# Patient Record
Sex: FEMALE | Race: WHITE | NOT HISPANIC OR LATINO | Employment: FULL TIME | URBAN - METROPOLITAN AREA
[De-identification: names, ages, dates, MRNs, and addresses within clinical notes are randomized per-mention and may not be internally consistent; named-entity substitution may affect disease eponyms.]

---

## 2017-01-14 ENCOUNTER — APPOINTMENT (OUTPATIENT)
Dept: LAB | Facility: HOSPITAL | Age: 47
End: 2017-01-14
Payer: COMMERCIAL

## 2017-01-14 ENCOUNTER — TRANSCRIBE ORDERS (OUTPATIENT)
Dept: LAB | Facility: HOSPITAL | Age: 47
End: 2017-01-14

## 2017-01-14 DIAGNOSIS — B18.2 CHRONIC VIRAL HEPATITIS C (HCC): ICD-10-CM

## 2017-01-14 PROCEDURE — 36415 COLL VENOUS BLD VENIPUNCTURE: CPT

## 2017-01-14 PROCEDURE — 87522 HEPATITIS C REVRS TRNSCRPJ: CPT

## 2017-01-17 LAB
HCV RNA SERPL NAA+PROBE-ACNC: NORMAL IU/ML
TEST INFORMATION: NORMAL

## 2017-01-20 ENCOUNTER — GENERIC CONVERSION - ENCOUNTER (OUTPATIENT)
Dept: OTHER | Facility: OTHER | Age: 47
End: 2017-01-20

## 2017-01-25 ENCOUNTER — GENERIC CONVERSION - ENCOUNTER (OUTPATIENT)
Dept: OTHER | Facility: OTHER | Age: 47
End: 2017-01-25

## 2017-04-18 ENCOUNTER — OFFICE VISIT (OUTPATIENT)
Dept: URGENT CARE | Age: 47
End: 2017-04-18
Payer: COMMERCIAL

## 2017-04-18 PROCEDURE — G0382 LEV 3 HOSP TYPE B ED VISIT: HCPCS | Performed by: FAMILY MEDICINE

## 2017-07-03 ENCOUNTER — ALLSCRIPTS OFFICE VISIT (OUTPATIENT)
Dept: OTHER | Facility: OTHER | Age: 47
End: 2017-07-03

## 2018-01-10 NOTE — RESULT NOTES
Verified Results  (1) CBC/PLT/DIFF 25Oct2016 06:35AM Steffi Montesw   TW Order Number: [id number]     Test Name Result Flag Reference   WBC COUNT 7 58 Thousand/uL  4 31-10 16   RBC COUNT 4 46 Million/uL  3 81-5 12   HEMOGLOBIN 14 0 g/dL  11 5-15 4   HEMATOCRIT 43 0 %  34 8-46  1   MCV 96 fL  82-98   MCH 31 4 pg  26 8-34 3   MCHC 32 6 g/dL  31 4-37 4   RDW 13 2 %  11 6-15 1   MPV 9 9 fL  8 9-12 7   PLATELET COUNT 858 Thousands/uL H 149-390   nRBC AUTOMATED 0 /100 WBCs     NEUTROPHILS RELATIVE PERCENT 53 %  43-75   LYMPHOCYTES RELATIVE PERCENT 36 %  14-44   MONOCYTES RELATIVE PERCENT 9 %  4-12   EOSINOPHILS RELATIVE PERCENT 2 %  0-6   BASOPHILS RELATIVE PERCENT 0 %  0-1   NEUTROPHILS ABSOLUTE COUNT 4 03 Thousands/?L  1 85-7 62   LYMPHOCYTES ABSOLUTE COUNT 2 70 Thousands/?L  0 60-4 47   MONOCYTES ABSOLUTE COUNT 0 65 Thousand/?L  0 17-1 22   EOSINOPHILS ABSOLUTE COUNT 0 17 Thousand/?L  0 00-0 61   BASOPHILS ABSOLUTE COUNT 0 02 Thousands/?L  0 00-0 10   - Patient Instructions: This bloodwork is non-fasting  Please drink two glasses of water morning of bloodwork  - Patient Instructions: This bloodwork is non-fasting  Please drink two glasses of water morning of bloodwork       (1) PT WITH INR 25Oct2016 06:35AM Steffi Montesw   TW Order Number: [id number]     Test Name Result Flag Reference   INR 1 05  0 86-1 16   PT 13 8 seconds  12 0-14 3

## 2018-01-11 NOTE — MISCELLANEOUS
Dear Sunita Sanches are some patient educational materials for [employer] and Ribavirin  As I discussed with you over the phone, you have been approved by your insurance  By this time, you should have already been  in touch with our [address] who will be filling your medication and shipping it to your home  It is very important that we keep in contact during this process, as your insurance is requiring re-authorization for refills, and we do not  want any gaps in your treatment  Please contact me when you have received your medication and are ready to start taking it       I can be reached at [phone] on Monday - Friday 8:00am - 4:30pm     Thank you,    Criselda Chairez RN      Electronically signed by:Marielena Marley   Jul 14 2016  4:08PM EST Author

## 2018-01-11 NOTE — RESULT NOTES
Message   All her labs are normal  Leidy Alonso will call her to work on prior autherization  Verified Results  US ABDOMEN LIMITED [id number] 07:39AM Vishal Abdelrahmans Order Number: [id number]     Test Name Result Flag Reference   US ABDOMEN LIMITED (Report)     RIGHT UPPER QUADRANT ULTRASOUND     INDICATION: History of hepatitis C  Status post cholecystectomy  COMPARISON: None  TECHNIQUE:  Real-time ultrasound of the right upper quadrant was performed with a curvilinear transducer with both volumetric sweeps and still imaging techniques  FINDINGS:     PANCREAS: Visualized portions of the pancreas are within normal limits  AORTA AND IVC: Visualized portions are normal for patient age  LIVER:   Size: Within normal range  The liver measures 14 7 cm in the midclavicular line  Contour: Surface contour is smooth  Parenchyma: Echogenicity and echotexture are within normal limits  No evidence of suspicious mass  The main portal vein is patent and hepatopetal       BILIARY:   Status post cholecystectomy  No intrahepatic biliary dilatation  CBD measures 4 mm  No choledocholithiasis  KIDNEY:    Right kidney measures 10 4 x 3 8 cm  Within normal limits  ASCITES:  None  IMPRESSION:     Unremarkable status post cholecystectomy  Workstation performed: [id number]     Signed by: Tommy Patel MD   5/23/16     (1) DRUG ABUSE SCREEN, URINE ROUTINE [id number] 07:07AM Vishal Field Order Number: [id number]     Test Name Result Flag Reference   AMPHETAMINE SCREEN URINE Negative ng/mL  Mfouba=7113   Amphetamine test includes Amphetamine and Methamphetamine  BARBITURATE SCREEN URINE Negative ng/mL  Hzigse=672   BENZODIAZEPINE SCREEN, URINE Negative ng/mL  Aiwjxr=530   CANNABINOID SCREEN URINE Negative ng/mL  Cutoff=50   COCAINE(METAB  )SCREEN, URINE Negative ng/mL  Kcfpkl=735   METHADONE SCREEN, URINE Negative ng/mL  Tnabnc=763   OPIATE SCREEN URINE Negative ng/mL  Embwgx=523   Opiate test includes Codeine and Morphine only     PHENCYCLIDINE (PCP), QUAL, UR Negative ng/mL  Cutoff=25   PROPOXYPHENE, SCREEN Negative ng/mL  Ofatlz=325   Performed at:  [address]  [id number]  : Ceci Michelle MD, Phone:  [phone]     (1) ALCOHOL,MEDICAL (SERUM) [id number] 07:07AM Emani Ag    Order Number: [id number]   Order Number: [id number]     Test Name Result Flag Reference   ALCOHOL,MEDICAL (SERUM) <3 mg/dL  0-3     (1) HEP B SURFACE ANTIGEN [id number] 07:07AM Jestine Balls Order Number: [id number]   Order Number: [id number]     Test Name Result Flag Reference   HEPATITIS B SURFACE ANTIGEN Non-reactive  Non-reactive, NonReactive - Confirmed     (1) HEP B SURFACE ANTIBODY [id number] 07:07AM Jestine Balls Order Number: [id number]   Order Number: [id number]     Test Name Result Flag Reference   HEPATITIS B SURFACE ANTIBODY >1000 00 mIU/mL     Protective Immunity: Hep B Surface Antibody >= 10 mIu/ml (Traceable to [facility] Reference Preparation)

## 2018-01-11 NOTE — RESULT NOTES
Verified Results  (1) CBC/ PLT (NO DIFF) [id number] 01:50PM Kathleen Chin Order Number: [id number]  TW Order Number: [id number]     Test Name Result Flag Reference   HEMATOCRIT 35 1 %  34 8-46 1   HEMOGLOBIN 11 4 g/dL L 11 5-15 4   MCHC 32 5 g/dL  31 4-37 4   MCH 30 4 pg  26 8-34 3   MCV 94 fL  82-98   PLATELET COUNT 054 Thousands/uL  149-390   RBC COUNT 3 75 Million/uL L 3 81-5 12   RDW 14 2 %  11 6-15 1   WBC COUNT 7 39 Thousand/uL  4 31-10 16   MPV 9 9 fL  8 9-12 7     (1) COMPREHENSIVE METABOLIC PANEL [id number] 15:32XT Kathleen Chin Order Number: [id number]   Order Number: [id number]     Test Name Result Flag Reference   GLUCOSE,RANDM 91 mg/dL     If the patient is fasting, the ADA then defines impaired fasting glucose as > 100 mg/dL and diabetes as > or equal to 123 mg/dL  SODIUM 138 mmol/L  136-145   POTASSIUM 4 4 mmol/L  3 5-5 3   CHLORIDE 106 mmol/L  100-108   CARBON DIOXIDE 25 mmol/L  21-32   ANION GAP (CALC) 7 mmol/L  4-13   BLOOD UREA NITROGEN 9 mg/dL  5-25   CREATININE 0 75 mg/dL  0 60-1 30   Standardized to IDMS reference method   CALCIUM 9 2 mg/dL  8 3-10 1   BILI, TOTAL 1 33 mg/dL H 0 20-1 00   ALK PHOSPHATAS 94 U/L     ALT (SGPT) 15 U/L  12-78   AST(SGOT) 9 U/L  5-45   ALBUMIN 4 1 g/dL  3 5-5 0   TOTAL PROTEIN 7 4 g/dL  6 4-8 2   eGFR Non-African American      >60 0 ml/min/1 73sq m   [facility] Disease Education Program recommendations are as follows:  GFR calculation is accurate only with a steady state creatinine  Chronic Kidney disease less than 60 ml/min/1 73 sq  meters  Kidney failure less than 15 ml/min/1 73 sq  meters  Plan  Chronic hepatitis C without hepatic coma    · (1) CBC/PLT/DIFF; Status:Active;  Requested for:01Oct2016;

## 2018-01-11 NOTE — RESULT NOTES
Verified Results  (1) CBC/PLT/DIFF 01Oct2016 09:36AM Mariama Moya   TW Order Number: [id number]     Test Name Result Flag Reference   WBC COUNT 8 12 Thousand/uL  4 31-10 16   RBC COUNT 4 01 Million/uL  3 81-5 12   HEMOGLOBIN 12 4 g/dL  11 5-15 4   HEMATOCRIT 38 1 %  34 8-46  1   MCV 95 fL  82-98   MCH 30 9 pg  26 8-34 3   MCHC 32 5 g/dL  31 4-37 4   RDW 14 0 %  11 6-15 1   MPV 9 2 fL  8 9-12 7   PLATELET COUNT 196 Thousands/uL  149-390   nRBC AUTOMATED 0 /100 WBCs     NEUTROPHILS RELATIVE PERCENT 60 %  43-75   LYMPHOCYTES RELATIVE PERCENT 28 %  14-44   MONOCYTES RELATIVE PERCENT 9 %  4-12   EOSINOPHILS RELATIVE PERCENT 3 %  0-6   BASOPHILS RELATIVE PERCENT 0 %  0-1   NEUTROPHILS ABSOLUTE COUNT 4 83 Thousands/?L  1 85-7 62   LYMPHOCYTES ABSOLUTE COUNT 2 29 Thousands/?L  0 60-4 47   MONOCYTES ABSOLUTE COUNT 0 70 Thousand/?L  0 17-1 22   EOSINOPHILS ABSOLUTE COUNT 0 27 Thousand/?L  0 00-0 61   BASOPHILS ABSOLUTE COUNT 0 02 Thousands/?L  0 00-0 10   - Patient Instructions: This bloodwork is non-fasting  Please drink two glasses of water morning of bloodwork  - Patient Instructions: This bloodwork is non-fasting  Please drink two glasses of water morning of bloodwork

## 2018-01-12 NOTE — MISCELLANEOUS
Provider Comments  Provider Comments:   pt no show for her annual      Signatures   Electronically signed by : Rc Rodriguez, ; Jul  3 2017 11:27AM EST                       (Author)

## 2018-01-13 NOTE — MISCELLANEOUS
Message  78-year-old female with chronic hepatitis C, GT1a, completed 12 weeks of Viekira and Ribavirin on October 12, 2016  She went for her 3 month SVR test which showed her viral load as undetectable  This is considered a cure  She has been informed of this and reports doing well  All questions were answered  Pt was advised that they are not immune to acquiring a new case of Hep C, so they should avoid high risk behaviors including sharing razor blades and toothbrushes, etc  Repeat SVR testing can be considered in one year  Follow-up as needed  Active Problems    1  Abdominal pain, epigastric (789 06) (R10 13)   2  Acute bronchitis (466 0) (J20 9)   3  Chronic fatigue (780 79) (R53 82)   4  Chronic hepatitis C without hepatic coma (070 54) (B18 2)   5  Constipation (564 00) (K59 00)   6  Dry cough (786 2) (R05)   7  Encounter for routine gynecological examination (V72 31) (Z01 419)   8  Encounter for routine gynecological examination with Papanicolaou smear of cervix   (V72 31,V76 2) (Z01 419)   9  Encounter for screening mammogram for malignant neoplasm of breast (V76 12)   (Z12 31)   10  Facial skin lesion (709 9) (L98 9)   11  Need for influenza vaccination (V04 81) (Z23)   12  Need for Tdap vaccination (V06 1) (Z23)   13  Screening for hyperlipidemia (V77 91) (Z13 220)   14  Viral hepatitis C (070 70) (B19 20)    Current Meds   1  Fiber (Guar Gum) CHEW; TAKE AS DIRECTED; Therapy: 83Sco6932 to Recorded   2  Multi Adult Gummies Oral Tablet Chewable; CHEW AND SWALLOW 1 TABLET DAILY; Therapy: 13Yfa3999 to Recorded   3  Nicotine 14 MG/24HR Transdermal Patch 24 Hour; APPLY 1 PATCH DAILY AS   DIRECTED; Therapy: 04PFK1819 to (Evaluate:60Mtq8262)  Requested for: 57FWE9249; Last   Rx:21Ebr8175 Ordered    Allergies    1  Penicillins    2  No Known Environmental Allergies   3   No Known Food Allergies    Signatures   Electronically signed by : Argenis Little, ; Jan 25 2017 12:25PM EST (Author)

## 2018-01-15 NOTE — RESULT NOTES
Verified Results  (1) HEP C RNA PCR, QUANTITATIVE 25Oct2016 06:35AM Julianne Garcia Order Number: [id number]     Test Name Result Flag Reference   HCV PCR QUANTITATIVE      HCV Not Detected IU/mL   TEST INFORMATION Comment     The quantitative range of this assay is 15 IU/mL to 100 million IU/mL      Performed at:  [address] [place] [address]  [id number]  : Jonelle Johnson MD, Phone:  [phone]

## 2018-01-15 NOTE — RESULT NOTES
Verified Results  (1) HEP C RNA PCR, QUANTITATIVE [id number] 07:28AM Kathleen [facility] Order Number: [id number]     Test Name Result Flag Reference   HCV PCR QUANTITATIVE      HCV Not Detected IU/mL   TEST INFORMATION Comment     The quantitative range of this assay is 15 IU/mL to 100 million IU/mL      Performed at:  [facility] [place] [address] [facility] [address]  [id number]  : Mainor Ramey MD, Phone:  [phone]

## 2018-01-16 NOTE — RESULT NOTES
Message  Patient's insurance denied [facility]  Sarah Mcclure is on their formulary  Plan - Viekira wilmer and riba for 12 weeks  Plan  Chronic hepatitis C without hepatic coma    · Harvoni  MG Oral Tablet   · Ribavirin 200 MG Oral Tablet; 200 mg tablets    Take 3 tablets in the morning and  Tke 2 tablets in the evening  daily x 28 days   · Viekira Wilmer 12 5-75-50 &250 MG Oral Tablet Therapy Pack; 1 tablet thepay pack  daily

## 2018-01-17 NOTE — RESULT NOTES
Verified Results  (1) HEP C RNA PCR, QUANTITATIVE [id number] 07:32AM Elizabeth Justice Order Number: [id number]     Test Name Result Flag Reference   HCV PCR QUANTITATIVE      HCV Not Detected IU/mL   TEST INFORMATION Comment     The quantitative range of this assay is 15 IU/mL to 100 million IU/mL      Performed at:  [facility] [place] [address] [facility] [address]  [id number]  : Judy Hill MD, Phone:  [phone]

## 2018-01-18 NOTE — MISCELLANEOUS
Message  Return to work or school:   Ena Ashley is under my professional care   She was seen in my office on 10/19/16             Future Appointments    Signatures   Electronically signed by : Amish Covarrubias MD; Oct 19 2016 11:30AM EST                       (Author)
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
## 2018-08-21 ENCOUNTER — APPOINTMENT (EMERGENCY)
Dept: RADIOLOGY | Facility: HOSPITAL | Age: 48
End: 2018-08-21
Payer: COMMERCIAL

## 2018-08-21 ENCOUNTER — HOSPITAL ENCOUNTER (EMERGENCY)
Facility: HOSPITAL | Age: 48
Discharge: HOME | End: 2018-08-21
Attending: EMERGENCY MEDICINE
Payer: COMMERCIAL

## 2018-08-21 VITALS
BODY MASS INDEX: 22.5 KG/M2 | DIASTOLIC BLOOD PRESSURE: 72 MMHG | OXYGEN SATURATION: 99 % | WEIGHT: 140 LBS | HEART RATE: 68 BPM | SYSTOLIC BLOOD PRESSURE: 108 MMHG | HEIGHT: 66 IN | RESPIRATION RATE: 16 BRPM

## 2018-08-21 DIAGNOSIS — S16.1XXA CERVICAL STRAIN: ICD-10-CM
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
## 2018-08-21 PROCEDURE — 99284 EMERGENCY DEPT VISIT MOD MDM: CPT

## 2018-08-21 PROCEDURE — 96372 THER/PROPH/DIAG INJ SC/IM: CPT

## 2018-08-21 PROCEDURE — 70450 CT HEAD/BRAIN W/O DYE: CPT

## 2018-08-21 PROCEDURE — 72125 CT NECK SPINE W/O DYE: CPT

## 2018-08-21 RX ORDER — ACETAMINOPHEN 325 MG/1
975 TABLET ORAL ONCE
Status: COMPLETED | OUTPATIENT
Start: 2018-08-21 | End: 2018-08-21

## 2018-08-21 RX ORDER — KETOROLAC TROMETHAMINE 30 MG/ML
15 INJECTION, SOLUTION INTRAMUSCULAR; INTRAVENOUS ONCE
Status: COMPLETED | OUTPATIENT
Start: 2018-08-21 | End: 2018-08-21

## 2018-08-21 RX ADMIN — KETOROLAC TROMETHAMINE 15 MG: 30 INJECTION, SOLUTION INTRAMUSCULAR at 08:19

## 2018-08-21 RX ADMIN — ACETAMINOPHEN 975 MG: 325 TABLET, FILM COATED ORAL at 08:20

## 2018-08-21 NOTE — DISCHARGE INSTRUCTIONS
Cervical Strain   WHAT YOU NEED TO KNOW:   A cervical strain is a stretched or torn muscle or tendon in your neck  Tendons are strong tissues that connect muscles to bones  Common causes of cervical strains include a car accident, a fall, or a sports injury  DISCHARGE INSTRUCTIONS:   Return to the emergency department if:   · You have pain or numbness from your shoulder down to your hand  · You have problems with your vision, hearing, or balance  · You feel confused or cannot concentrate  · You have problems with movement and strength  Contact your healthcare provider if:   · You have increased swelling or pain in your neck  · You have questions or concerns about your condition or care  Medicines: You may need any of the following:  · Acetaminophen  decreases pain and fever  It is available without a doctor's order  Ask how much to take and how often to take it  Follow directions  Read the labels of all other medicines you are using to see if they also contain acetaminophen, or ask your doctor or pharmacist  Acetaminophen can cause liver damage if not taken correctly  Do not use more than 4 grams (4,000 milligrams) total of acetaminophen in one day  · NSAIDs , such as ibuprofen, help decrease swelling, pain, and fever  This medicine is available with or without a doctor's order  NSAIDs can cause stomach bleeding or kidney problems in certain people  If you take blood thinner medicine, always ask your healthcare provider if NSAIDs are safe for you  Always read the medicine label and follow directions  · Muscle relaxers  help decrease pain and muscle spasms  · Prescription pain medicine  may be given  Ask your healthcare provider how to take this medicine safely  Some prescription pain medicines contain acetaminophen  Do not take other medicines that contain acetaminophen without talking to your healthcare provider  Too much acetaminophen may cause liver damage   Prescription pain medicine may cause constipation  Ask your healthcare provider how to prevent or treat constipation  · Take your medicine as directed  Contact your healthcare provider if you think your medicine is not helping or if you have side effects  Tell him or her if you are allergic to any medicine  Keep a list of the medicines, vitamins, and herbs you take  Include the amounts, and when and why you take them  Bring the list or the pill bottles to follow-up visits  Carry your medicine list with you in case of an emergency  Manage your symptoms:   · Apply heat  on your neck for 15 to 20 minutes, 4 to 6 times a day or as directed  Heat helps decrease pain, stiffness, and muscle spasms  · Begin gentle neck exercises  as soon as you can move your neck without pain  Exercises will help decrease stiffness and improve the strength and movement of your neck  Ask your healthcare provider what kind of exercises you should do  · Gradually return to your usual activities as directed  Stop if you have pain  Avoid activities that can cause more damage to your neck, such as heavy lifting or strenuous exercise  · Sleep without a pillow  to help decrease pain  Instead, roll a small towel tightly and place it under your neck  · Go to physical therapy as directed  A physical therapist teaches you exercises to help improve movement and strength, and to decrease pain  Prevent neck injury:   · Drive safely  Make sure everyone in your car wears a seatbelt  A seatbelt can save your life if you are in an accident  Do not use your cell phone when you are driving  This could distract you and cause an accident  Pull over if you need to make a call or send a text message  · Wear helmets, lifejackets, and protective gear  Always wear a helmet when you ride a bike or motorcycle, go skiing, or play sports that could cause a head injury  Wear protective equipment when you play sports   Wear a lifejacket when you are on a boat or doing water sports  Follow up with your healthcare provider as directed: You may be referred to an orthopedist or physical therapies  Write down your questions so you remember to ask them during your visits  © 2017 [employer] Information is for End User's use only and may not be sold, redistributed or otherwise used for commercial purposes  All illustrations and images included in CareNotes® are the copyrighted property of A D A M , Inc  or [employer]  The above information is an  only  It is not intended as medical advice for individual conditions or treatments  Talk to your doctor, nurse or pharmacist before following any medical regimen to see if it is safe and effective for you
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
## 2018-08-28 RX ORDER — PREDNISONE 50 MG/1
1 TABLET ORAL DAILY
COMMUNITY
Start: 2017-04-18 | End: 2018-08-29

## 2018-08-29 ENCOUNTER — OFFICE VISIT (OUTPATIENT)
Dept: INTERNAL MEDICINE CLINIC | Facility: CLINIC | Age: 48
End: 2018-08-29
Payer: COMMERCIAL

## 2018-08-29 VITALS
HEART RATE: 80 BPM | BODY MASS INDEX: 22.39 KG/M2 | WEIGHT: 139.33 LBS | TEMPERATURE: 98.1 F | HEIGHT: 66 IN | SYSTOLIC BLOOD PRESSURE: 108 MMHG | DIASTOLIC BLOOD PRESSURE: 80 MMHG
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
## 2018-08-29 DIAGNOSIS — M54.2 NECK PAIN: Primary | ICD-10-CM

## 2018-08-29 PROCEDURE — 99204 OFFICE O/P NEW MOD 45 MIN: CPT | Performed by: NURSE PRACTITIONER

## 2018-08-29 RX ORDER — CYCLOBENZAPRINE HCL 5 MG
5 TABLET ORAL 3 TIMES DAILY PRN
Qty: 60 TABLET | Refills: 0 | Status: SHIPPED | OUTPATIENT
Start: 2018-08-29 | End: 2021-06-03
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
## 2018-08-29 NOTE — PATIENT INSTRUCTIONS
Acute Neck Pain   WHAT YOU NEED TO KNOW:   Acute neck pain starts suddenly, increases quickly, and goes away in a few days  The pain may come and go, or be worse with certain movements  The pain may be only in your neck, or it may move to your arms, back, or shoulders  You may also have pain that starts in another body area and moves to your neck  DISCHARGE INSTRUCTIONS:   Return to the emergency department if:   · You have an injury that causes neck pain and shooting pain down your arms or legs  · Your neck pain suddenly becomes severe  · You have neck pain along with numbness, tingling, or weakness in your arms or legs  · You have a stiff neck, a headache, and a fever  Contact your healthcare provider if:   · You have new or worsening symptoms  · Your symptoms continue even after treatment  · You have questions or concerns about your condition or care  Medicines:   · NSAIDs , such as ibuprofen, help decrease swelling, pain, and fever  This medicine is available without a doctor's order  Ask your healthcare provider which medicine to take and how often to take it  Follow directions  NSAIDs can cause stomach bleeding or kidney problems if not taken correctly  If you take blood thinner medicine, always ask if NSAIDs are safe for you  · Acetaminophen  helps decrease pain and fever  Ask your healthcare provider how much to take and how often to take it  Follow directions  Acetaminophen can cause liver damage if not taken correctly  · Steroid medicine  may be used to reduce inflammation  This can help relieve pain caused by swelling  · Take your medicine as directed  Contact your healthcare provider if you think your medicine is not helping or if you have side effects  Tell him or her if you are allergic to any medicine  Keep a list of the medicines, vitamins, and herbs you take  Include the amounts, and when and why you take them  Bring the list or the pill bottles to follow-up visits  Carry your medicine list with you in case of an emergency  Manage or prevent acute neck pain:   · Rest your neck as directed  Do not make sudden movements, such as turning your head quickly  Your healthcare provider may recommend you wear a cervical collar for a short time  The collar will prevent you from moving your head  This will give your neck time to heal if an injury is causing your neck pain  Ask your healthcare provider when you can return to sports or other normal daily activities  · Apply heat as directed  Heat helps relieve pain and swelling  Use a heat wrap, or soak a small towel in warm water  Wring out the extra water  Apply the heat wrap or towel for 20 minutes every hour, or as directed  · Apply ice as directed  Ice helps relieve pain and swelling, and can help prevent tissue damage  Use an ice pack, or put ice in a bag  Cover the ice pack or back with a towel before you apply it to your neck  Apply the ice pack or ice for 15 minutes every hour, or as directed  Your healthcare provider can tell you how often to apply ice  · Do neck exercises as directed  Neck exercises help strengthen the muscles and increase range of motion  Your healthcare provider will tell you which exercises are right for you  He may give you instructions, or he may recommend that you work with a physical therapist  Your healthcare provider or therapist can make sure you are doing the exercises correctly  · Maintain good posture  Try to keep your head and shoulders lifted when you sit  If you work in front of a computer, make sure the monitor is at the right level  You should not need to look up down to see the screen  You should also not have to lean forward to be able to read what is on the screen  Make sure your keyboard, mouse, and other computer items are placed where you do not have to extend your shoulder to reach them  Get up often if you work in front of a computer or sit for long periods of time  Stretch or walk around to keep your neck muscles loose  Follow up with your healthcare provider as directed: Your healthcare provider may refer you to a specialist if your pain does not get better with treatment  Write down your questions so you remember to ask them during your visits  © 2017 [employer] Information is for End User's use only and may not be sold, redistributed or otherwise used for commercial purposes  All illustrations and images included in CareNotes® are the copyrighted property of [employer]  or [employer]  The above information is an  only  It is not intended as medical advice for individual conditions or treatments  Talk to your doctor, nurse or pharmacist before following any medical regimen to see if it is safe and effective for you

## 2019-02-07 ENCOUNTER — OFFICE VISIT (OUTPATIENT)
Dept: OBGYN CLINIC | Facility: CLINIC | Age: 49
End: 2019-02-07

## 2019-02-07 VITALS
HEART RATE: 84 BPM | SYSTOLIC BLOOD PRESSURE: 122 MMHG | HEIGHT: 66 IN | BODY MASS INDEX: 21.86 KG/M2 | DIASTOLIC BLOOD PRESSURE: 74 MMHG | WEIGHT: 136 LBS

## 2019-02-07 DIAGNOSIS — Z01.419 WOMEN'S ANNUAL ROUTINE GYNECOLOGICAL EXAMINATION: Primary | ICD-10-CM

## 2019-02-07 DIAGNOSIS — Z12.39 BREAST CANCER SCREENING: ICD-10-CM

## 2019-02-07 PROCEDURE — 99396 PREV VISIT EST AGE 40-64: CPT | Performed by: NURSE PRACTITIONER

## 2019-02-07 PROCEDURE — 3725F SCREEN DEPRESSION PERFORMED: CPT | Performed by: NURSE PRACTITIONER

## 2019-02-07 NOTE — PATIENT INSTRUCTIONS
Mammogram   WHAT YOU NEED TO KNOW:   What do I need to know about a mammogram?  A mammogram is an x-ray of your breasts to screen for breast cancer  Experts recommend mammograms every 2 years starting at age 48 years  You may need a mammogram at age 52 years or younger if you have an increased risk for breast cancer  Talk to your healthcare provider about when you should start having mammograms and how often you need them  How do I prepare for a mammogram?   · Do not use deodorant, powder, lotion, or perfume  These products may cause particles to appear on your mammogram      · Wear a 2-piece outfit  · If your breasts are tender before your monthly period, do not have a mammogram during this time  Schedule your mammogram to be done 1 week after your period ends  · If you are breastfeeding, express as much milk as possible before the mammogram     · Bring a list of the dates and places of your past mammograms and other breast tests or treatments  What will happen during a mammogram?  A top view and a side view x-ray are usually done for each breast  Tell healthcare providers if you have breast implants or breast problems before you have your mammogram  You may need extra x-rays of each breast   · You will be given a hospital gown  Take off your clothes from the waist up  Wear the hospital gown so that it opens in the front  · You will sit or stand next to a small x-ray machine  The healthcare provider will help you place one of your breasts on the x-ray plate  Your arm and breast will be moved until your breast is in the correct position  · Your breast will be gently pressed between 2 plastic plates for a few seconds while the x-ray is taken  This may be uncomfortable  · You will be asked to hold your breath while the x-ray is taken  Another x-ray will be taken of the same breast after the position of the x-ray machine has been changed  · Your other breast will be x-rayed the same way    What will happen after my mammogram?  Your breasts may feel tender for a short while after the mammogram  You may resume your regular activities  Ask your healthcare provider when you should receive the results of your mammogram   What are the risks of a mammogram?  You will be exposed to a small amount of radiation  Some breast cancers may not show up on mammograms  When should I contact my healthcare provider? · You cannot make your appointment on time  · You do not receive your results when expected  · You have questions or concerns about the mammogram   CARE AGREEMENT:   You have the right to help plan your care  Learn about your health condition and how it may be treated  Discuss treatment options with your caregivers to decide what care you want to receive  You always have the right to refuse treatment  The above information is an  only  It is not intended as medical advice for individual conditions or treatments  Talk to your doctor, nurse or pharmacist before following any medical regimen to see if it is safe and effective for you  © 2017 [employer]  Information is for End User's use only and may not be sold, redistributed or otherwise used for commercial purposes  All illustrations and images included in CareNotes® are the copyrighted property of [employer]  or [employer]  Breast Self Exam for Women   WHAT YOU NEED TO KNOW:   What is a breast self-exam (BSE)? A BSE is a way to check your breasts for lumps and other changes  Regular BSEs can help you know how your breasts normally look and feel  Most breast lumps or changes are not cancer, but you should always have them checked by a healthcare provider  Your healthcare provider can also watch you do a BSE and can tell you if you are doing your BSE correctly  Why should I do a BSE? Breast cancer is the most common type of cancer in women   Even if you have mammograms, you may still want to do a BSE regularly  If you know how your breasts normally feel and look, it may help you know when to contact your healthcare provider  Mammograms can miss some cancers  You may find a lump during a BSE that did not show up on your mammogram   When should I do a BSE? Anshul your calendar to help you remember to do BSE on a regular schedule  One easy way to remember to do a BSE is to do the exam on the same day of each month  If you have periods, you may want to do your BSE 1 week after your period ends  This is the time when your breasts may be the least swollen, lumpy, or tender  You can do regular BSEs even if you are breastfeeding or have breast implants  How should I do a BSE? · Look at your breasts in a mirror  Look at the size and shape of each breast and nipple  Check for swelling, lumps, dimpling, scaly skin, or other skin changes  Look for nipple changes, such as a nipple that is painful or beginning to pull inward  Gently squeeze both nipples and check to see if fluid (that is not breast milk) comes out of them  If you find any of these or other breast changes, contact your healthcare provider  Check your breasts while you sit or  the following 3 positions:    [facility] your arms down at your sides  ¨ Raise your hands and join them behind your head  ¨ Put firm pressure with your hands on your hips  Bend slightly forward while you look at your breasts in the mirror  · Lie down and feel your breasts  When you lie down, your breast tissue spreads out evenly over your chest  This makes it easier for you to feel for lumps and anything that may not be normal for your breasts  Do a BSE on one breast at a time  ¨ Place a small pillow or towel under your left shoulder  Put your left arm behind your head  ¨ Use the 3 middle fingers of your right hand  Use your fingertip pads, on the top of your fingers  Your fingertip pad is the most sensitive part of your finger      ¨ Use small circles to feel your breast tissue  Use your fingertip pads to make dime-sized, overlapping circles on your breast and armpits  Use light, medium, and firm pressure  First, press lightly  Second, press with medium pressure to feel a little deeper into the breast  Last, use firm pressure to feel deep within your breast     ¨ Examine your entire breast area  Examine the breast area from above the breast to below the breast where you feel only ribs  Make small circles with your fingertips, starting in the middle of your armpit  Make circles going up and down the breast area  Continue toward your breast and all the way across it  Examine the area from your armpit all the way over to the middle of your chest (breastbone)  Stop at the middle of your chest     ¨ Move the pillow or towel to your right shoulder, and put your right arm behind your head  Use the 3 fingertip pads of your left hand, and repeat the above steps to do a BSE on your right breast        What else can I do to check for breast problems or cancer? Some experts suggest that women 36years of age or older should have a mammogram every year  Other experts suggest that women between the ages of 48and 76years old should have a mammogram every 2 years  Talk to your healthcare provider about when you should have a mammogram   When should I contact my healthcare provider? · You find any lumps or changes in your breasts  · You have breast pain or fluid coming from your nipples  · You have questions or concerns or concerns about your condition or care  CARE AGREEMENT:   You have the right to help plan your care  Learn about your health condition and how it may be treated  Discuss treatment options with your caregivers to decide what care you want to receive  You always have the right to refuse treatment  The above information is an  only  It is not intended as medical advice for individual conditions or treatments   Talk to your doctor, nurse or pharmacist before following any medical regimen to see if it is safe and effective for you  © 2017 [address] [employer] Information is for End User's use only and may not be sold, redistributed or otherwise used for commercial purposes  All illustrations and images included in CareNotes® are the copyrighted property of [employer] Inc  or Zay [employer]  Cigarette Smoking and Your Health   AMBULATORY CARE:   Risks to your health if you smoke:  Nicotine and other chemicals found in tobacco damage every cell in your body  Even if you are a light smoker, you have an increased risk for cancer, heart disease, and lung disease  If you are pregnant or have diabetes, smoking increases your risk for complications  Benefits to your health if you stop smoking:   · You decrease respiratory symptoms such as coughing, wheezing, and shortness of breath  · You reduce your risk for cancers of the lung, mouth, throat, kidney, bladder, pancreas, stomach, and cervix  If you already have cancer, you increase the benefits of chemotherapy  You also reduce your risk for cancer returning or a second cancer from developing  · You reduce your risk for heart disease, blood clots, heart attack, and stroke  · You reduce your risk for lung infections, and diseases such as pneumonia, asthma, chronic bronchitis, and emphysema  · Your circulation improves  More oxygen can be delivered to your body  If you have diabetes, you lower your risk for complications, such as kidney, artery, and eye diseases  You also lower your risk for nerve damage  Nerve damage can lead to amputations, poor vision, and blindness  · You improve your body's ability to heal and to fight infections  Benefits to the health of others if you stop smoking:  Tobacco is harmful to nonsmokers who breathe in your secondhand smoke   The following are ways the health of others around you may improve when you stop smoking:  · You lower the risks for lung cancer and heart disease in nonsmoking adults  · If you are pregnant, you lower the risk for miscarriage, early delivery, low birth weight, and stillbirth  You also lower your baby's risk for SIDS, obesity, developmental delay, and neurobehavioral problems, such as ADHD  · If you have children, you lower their risk for ear infections, colds, pneumonia, bronchitis, and asthma  For more information and support to stop smoking:   · [facility]  Phone: 0- 578 - [phone]  Web Address: www [employer]  Follow up with your healthcare provider as directed:  Write down your questions so you remember to ask them during your visits  © 2017 [facility] [facility] Information is for End User's use only and may not be sold, redistributed or otherwise used for commercial purposes  All illustrations and images included in CareNotes® are the copyrighted property of [employer]  or Zay Roth  The above information is an  only  It is not intended as medical advice for individual conditions or treatments  Talk to your doctor, nurse or pharmacist before following any medical regimen to see if it is safe and effective for you

## 2019-02-07 NOTE — PROGRESS NOTES
Dallas Mccabe is a 51 y o  female who presents for annual well woman exam   Last Pap smear 2/10/16- NILM/ HR HPV negative  Next Pap smear due 2021  Last mammogram 3/2016-with recommendations to follow up 1 year  Mammogram ordered today  Has not had colonoscopy  Periods are regular every 28-30 days, lasting 3-4 days  No intermenstrual bleeding, spotting, or discharge  Current contraception: abstinence  History of abnormal Pap smear: yes - many years ago  Family history of uterine or ovarian cancer: no  Regular self breast exam: no  History of abnormal mammogram: yes - breast cyst found in   Family history of breast cancer: no  History of abnormal lipids: no  Menstrual History:  OB History      Para Term  AB Living    4 4 4     4    SAB TAB Ectopic Multiple Live Births                      Menarche age: 14  Patient's last menstrual period was 2019 (approximate)  Period Cycle (Days):  (monthly)  Period Duration (Days): 3-4  Period Pattern: Regular  Menstrual Flow: Moderate, Light, Heavy  Dysmenorrhea: (!) Mild  Dysmenorrhea Symptoms: Cramping    The following portions of the patient's history were reviewed and updated as appropriate: allergies, current medications, past family history, past medical history, past social history, past surgical history and problem list     Review of Systems  Pertinent items are noted in HPI        Objective      /74   Pulse 84   Ht 5' 6" (1 676 m)   Wt 61 7 kg (136 lb)   LMP 2019 (Approximate)   BMI 21 95 kg/m²     General:   alert and oriented, in no acute distress, alert, appears stated age and cooperative   Heart: regular rate and rhythm, S1, S2 normal, no murmur, click, rub or gallop   Lungs: clear to auscultation bilaterally   Abdomen: soft, non-tender, without masses or organomegaly, nondistended and normal bowel sounds   Vulva: normal, Bartholin's, Urethra, Douds's normal, female escutcheon   Vagina: normal mucosa, normal discharge, no palpable nodules   Cervix: no cervical motion tenderness and no lesions   Uterus: normal size, non-tender, normal shape and consistency   Adnexa: normal adnexa and no mass, fullness, tenderness   Breast:  Nontender, no palpable masses, no nipple discharge, no skin changes bilaterally          Assessment      @well woman@   Plan      All questions answered  Breast self exam technique reviewed and patient encouraged to perform self-exam monthly  Contraception: Reviewed options for birth control  Patient declines  Diagnosis explained in detail, including differential   Dietary diary  Discussed healthy lifestyle modifications  Educational material distributed  Mammogram   Breast awareness reviewed    Encouraged to continue healthy diet and exercise regimen  Encouraged to quit smoking  Reviewed recommendations for colonoscopy after age 39  Patient declines referral will speak to her PCP regarding testing      RTO 1 year for annual exam or sooner as needed

## 2019-06-17 ENCOUNTER — TELEPHONE (OUTPATIENT)
Dept: OBGYN CLINIC | Facility: CLINIC | Age: 49
End: 2019-06-17

## 2020-01-20 ENCOUNTER — OFFICE VISIT (OUTPATIENT)
Dept: URGENT CARE | Age: 50
End: 2020-01-20
Payer: COMMERCIAL

## 2020-01-20 VITALS
HEART RATE: 76 BPM | OXYGEN SATURATION: 97 % | RESPIRATION RATE: 18 BRPM | TEMPERATURE: 98 F | DIASTOLIC BLOOD PRESSURE: 60 MMHG | SYSTOLIC BLOOD PRESSURE: 109 MMHG

## 2020-01-20 DIAGNOSIS — J01.10 ACUTE NON-RECURRENT FRONTAL SINUSITIS: ICD-10-CM

## 2020-01-20 DIAGNOSIS — R68.89 FLU-LIKE SYMPTOMS: Primary | ICD-10-CM

## 2020-01-20 LAB
FLUAV RNA NPH QL NAA+PROBE: DETECTED
FLUBV RNA NPH QL NAA+PROBE: ABNORMAL
RSV RNA NPH QL NAA+PROBE: ABNORMAL

## 2020-01-20 PROCEDURE — 99213 OFFICE O/P EST LOW 20 MIN: CPT | Performed by: PHYSICIAN ASSISTANT

## 2020-01-20 PROCEDURE — 87631 RESP VIRUS 3-5 TARGETS: CPT | Performed by: PHYSICIAN ASSISTANT

## 2020-01-20 RX ORDER — DOXYCYCLINE 100 MG/1
100 TABLET ORAL 2 TIMES DAILY
Qty: 14 TABLET | Refills: 0 | Status: SHIPPED | OUTPATIENT
Start: 2020-01-20 | End: 2020-01-27

## 2020-01-20 NOTE — PROGRESS NOTES
[facility]        NAME: Sandrine Barnett is a 47 y o  female  : 1970    MRN: [id number]  DATE: 2020  TIME: 11:14 AM    Assessment and Plan   Flu-like symptoms [R68 89]  1  Flu-like symptoms  Influenza A/B and RSV by PCR   2  Acute non-recurrent frontal sinusitis  doxycycline (ADOXA) 100 MG tablet         Patient Instructions     -will sent for fluid culture  -start antibiotics as directed  -over-the-counter cold medicines as  Follow up with PCP in 3-5 days  Proceed to  ER if symptoms worsen  Chief Complaint     Chief Complaint   Patient presents with    Abdominal Pain     RLQ pain when coughs x friday    Cold Like Symptoms     cough, chills and sweats, chest congestion x friday     Dizziness     x yesterday          History of Present Illness       Patient presents with fever, body aches, chills since last Wednesday  She states that she started with a bit of chest congestion, headaches and dizziness today  She states she does feel slightly better though  Review of Systems   Review of Systems   Constitutional: Positive for chills, fatigue and fever  HENT: Positive for congestion, sinus pressure and sinus pain  Negative for sore throat  Respiratory: Positive for cough  Negative for shortness of breath and wheezing  Cardiovascular: Negative  Gastrointestinal: Negative  Musculoskeletal: Negative  Neurological: Positive for dizziness and headaches  Psychiatric/Behavioral: Negative            Current Medications       Current Outpatient Medications:     cyclobenzaprine (FLEXERIL) 5 mg tablet, Take 1 tablet (5 mg total) by mouth 3 (three) times a day as needed for muscle spasms (Patient not taking: Reported on 2019 ), Disp: 60 tablet, Rfl: 0    doxycycline (ADOXA) 100 MG tablet, Take 1 tablet (100 mg total) by mouth 2 (two) times a day for 7 days, Disp: 14 tablet, Rfl: 0    ibuprofen (ADVIL,MOTRIN) 100 MG tablet, Take 100 mg by mouth as needed for mild pain, Disp: , Rfl:     Current Allergies     Allergies as of 01/20/2020 - Reviewed 01/20/2020   Allergen Reaction Noted    Penicillins Hives, Edema, and Throat Swelling 08/09/2012            The following portions of the patient's history were reviewed and updated as appropriate: allergies, current medications, past family history, past medical history, past social history, past surgical history and problem list      Past Medical History:   Diagnosis Date    Arthritis     Hip    Hep C w/o coma, chronic ([facility] [address] ) 2014    Patient states completed treatment       Past Surgical History:   Procedure Laterality Date   [address] [employer], 2004    CHOLECYSTECTOMY      GYNECOLOGIC CRYOSURGERY      Cervix    LIVER BIOPSY  02/23/2016    Last assessed       Family History   Problem Relation Age of Onset    Diabetes Mother     Arthritis Mother     Heart disease Father     Lung cancer Father     Diabetes Maternal Grandmother     Coronary artery disease Maternal Grandfather     Heart attack Paternal Grandfather     No Known Problems Brother     Asthma Daughter     No Known Problems Son          Medications have been verified  Objective   /60   Pulse 76   Temp 98 °F (36 7 °C)   Resp 18   SpO2 97%        Physical Exam     Physical Exam   Constitutional: She is oriented to person, place, and time  She appears well-developed and well-nourished  Non-toxic appearance  She does not appear ill  No distress  HENT:   Head: Normocephalic  Mouth/Throat: Oropharynx is clear and moist  No oropharyngeal exudate  Cardiovascular: Normal rate, regular rhythm and normal heart sounds  Pulmonary/Chest: Effort normal and breath sounds normal  She has no wheezes  Neurological: She is alert and oriented to person, place, and time  Skin: Skin is warm and dry  Psychiatric: She has a normal mood and affect  Her behavior is normal    Nursing note and vitals reviewed

## 2020-01-20 NOTE — PATIENT INSTRUCTIONS
Influenza   WHAT YOU NEED TO KNOW:   Influenza (the flu) is an infection caused by the influenza virus  The flu is easily spread when an infected person coughs, sneezes, or has close contact with others  You may be able to spread the flu to others for 1 week or longer after signs or symptoms appear  DISCHARGE INSTRUCTIONS:   Call 911 for any of the following:   · You have trouble breathing, and your lips look purple or blue  · You have a seizure  Return to the emergency department if:   · You are dizzy, or you are urinating less or not at all  · You have a headache with a stiff neck, and you feel tired or confused  · You have new pain or pressure in your chest     · Your symptoms, such as shortness of breath, vomiting, or diarrhea, get worse  · Your symptoms, such as fever and coughing, seem to get better, but then get worse  Contact your healthcare provider if:   · You have new muscle pain or weakness  · You have questions or concerns about your condition or care  Medicines: You may need any of the following:  · Acetaminophen  decreases pain and fever  It is available without a doctor's order  Ask how much to take and how often to take it  Follow directions  Acetaminophen can cause liver damage if not taken correctly  · NSAIDs , such as ibuprofen, help decrease swelling, pain, and fever  This medicine is available with or without a doctor's order  NSAIDs can cause stomach bleeding or kidney problems in certain people  If you take blood thinner medicine, always ask your healthcare provider if NSAIDs are safe for you  Always read the medicine label and follow directions  · Antivirals  help fight a viral infection  · Take your medicine as directed  Contact your healthcare provider if you think your medicine is not helping or if you have side effects  Tell him or her if you are allergic to any medicine  Keep a list of the medicines, vitamins, and herbs you take   Include the amounts, and when and why you take them  Bring the list or the pill bottles to follow-up visits  Carry your medicine list with you in case of an emergency  Rest  as much as you can to help you recover  Drink liquids as directed  to help prevent dehydration  Ask how much liquid to drink each day and which liquids are best for you  Prevent the spread of influenza:   · Wash your hands often  Use soap and water  Wash your hands after you use the bathroom, change a child's diapers, or sneeze  Wash your hands before you prepare or eat food  Use gel hand cleanser when soap and water are not available  Do not touch your eyes, nose, or mouth unless you have washed your hands first            · Cover your mouth when you sneeze or cough  Cough into a tissue or the bend of your arm  · Clean shared items with a germ-killing   Clean table surfaces, doorknobs, and light switches  Do not share towels, silverware, and dishes with people who are sick  Wash bed sheets, towels, silverware, and dishes with soap and water  · Wear a mask  over your mouth and nose if you are sick or are near anyone who is sick  · Stay away from others  if you are sick  · Influenza vaccine  helps prevent influenza (flu)  Everyone older than 6 months should get a yearly influenza vaccine  Get the vaccine as soon as it is available, usually in September or October each year  Follow up with your healthcare provider as directed:  Write down your questions so you remember to ask them during your visits  © 2017 [address] [employer] Information is for End User's use only and may not be sold, redistributed or otherwise used for commercial purposes  All illustrations and images included in CareNotes® are the copyrighted property of A D A [facility] [employer]  or Zay Roth  The above information is an  only  It is not intended as medical advice for individual conditions or treatments   Talk to your doctor, nurse or pharmacist before following any medical regimen to see if it is safe and effective for you

## 2020-01-20 NOTE — LETTER
January 20, 2020     Patient: Mae Diaz   YOB: 1970   Date of Visit: 1/20/2020       To Whom it May Concern:    Faribahanna Gonzales is under my professional care  She was seen in my office on 1/20/2020  Please excuse her from work on 1/16-1/21 She may return to work on 1/22/2020  If you have any questions or concerns, please don't hesitate to call           Sincerely,          [facility]        CC: No Recipients

## 2020-01-22 ENCOUNTER — TELEPHONE (OUTPATIENT)
Dept: URGENT CARE | Age: 50
End: 2020-01-22

## 2020-12-09 ENCOUNTER — OFFICE VISIT (OUTPATIENT)
Dept: URGENT CARE | Age: 50
End: 2020-12-09
Payer: COMMERCIAL

## 2020-12-09 VITALS
TEMPERATURE: 96.5 F | BODY MASS INDEX: 22.76 KG/M2 | HEART RATE: 98 BPM | HEIGHT: 67 IN | OXYGEN SATURATION: 100 % | WEIGHT: 145 LBS

## 2020-12-09 DIAGNOSIS — Z20.822 COVID-19 RULED OUT: Primary | ICD-10-CM

## 2020-12-09 DIAGNOSIS — R42 DIZZINESS AND GIDDINESS: ICD-10-CM

## 2020-12-09 PROCEDURE — 99213 OFFICE O/P EST LOW 20 MIN: CPT | Performed by: NURSE PRACTITIONER

## 2020-12-09 PROCEDURE — 87637 SARSCOV2&INF A&B&RSV AMP PRB: CPT | Performed by: NURSE PRACTITIONER

## 2020-12-12 LAB
FLUAV RNA NPH QL NAA+PROBE: NOT DETECTED
FLUBV RNA NPH QL NAA+PROBE: NOT DETECTED
RSV RNA NPH QL NAA+PROBE: NOT DETECTED
SARS-COV-2 RNA NPH QL NAA+PROBE: NOT DETECTED

## 2021-03-10 DIAGNOSIS — Z23 ENCOUNTER FOR IMMUNIZATION: ICD-10-CM

## 2021-03-22 ENCOUNTER — IMMUNIZATIONS (OUTPATIENT)
Dept: FAMILY MEDICINE CLINIC | Facility: HOSPITAL | Age: 51
End: 2021-03-22

## 2021-03-22 DIAGNOSIS — Z23 ENCOUNTER FOR IMMUNIZATION: Primary | ICD-10-CM

## 2021-03-22 PROCEDURE — 91301 SARS-COV-2 / COVID-19 MRNA VACCINE (MODERNA) 100 MCG: CPT

## 2021-03-22 PROCEDURE — 0011A SARS-COV-2 / COVID-19 MRNA VACCINE (MODERNA) 100 MCG: CPT

## 2021-04-21 ENCOUNTER — IMMUNIZATIONS (OUTPATIENT)
Dept: FAMILY MEDICINE CLINIC | Facility: HOSPITAL | Age: 51
End: 2021-04-21

## 2021-04-21 DIAGNOSIS — Z23 ENCOUNTER FOR IMMUNIZATION: Primary | ICD-10-CM

## 2021-04-21 PROCEDURE — 0012A SARS-COV-2 / COVID-19 MRNA VACCINE (MODERNA) 100 MCG: CPT

## 2021-04-21 PROCEDURE — 91301 SARS-COV-2 / COVID-19 MRNA VACCINE (MODERNA) 100 MCG: CPT

## 2021-06-03 ENCOUNTER — ANNUAL EXAM (OUTPATIENT)
Dept: OBGYN CLINIC | Facility: CLINIC | Age: 51
End: 2021-06-03

## 2021-06-03 VITALS
SYSTOLIC BLOOD PRESSURE: 107 MMHG | HEIGHT: 67 IN | HEART RATE: 93 BPM | DIASTOLIC BLOOD PRESSURE: 74 MMHG | WEIGHT: 146.2 LBS | BODY MASS INDEX: 22.95 KG/M2

## 2021-06-03 DIAGNOSIS — Z12.31 SCREENING MAMMOGRAM, ENCOUNTER FOR: ICD-10-CM

## 2021-06-03 DIAGNOSIS — Z12.11 COLON CANCER SCREENING: ICD-10-CM

## 2021-06-03 DIAGNOSIS — Z01.419 WELL WOMAN EXAM WITH ROUTINE GYNECOLOGICAL EXAM: Primary | ICD-10-CM

## 2021-06-03 PROCEDURE — 99396 PREV VISIT EST AGE 40-64: CPT | Performed by: NURSE PRACTITIONER

## 2021-06-03 PROCEDURE — 3008F BODY MASS INDEX DOCD: CPT | Performed by: NURSE PRACTITIONER

## 2021-06-03 PROCEDURE — 87624 HPV HI-RISK TYP POOLED RSLT: CPT | Performed by: NURSE PRACTITIONER

## 2021-06-03 PROCEDURE — 3725F SCREEN DEPRESSION PERFORMED: CPT | Performed by: NURSE PRACTITIONER

## 2021-06-03 PROCEDURE — G0145 SCR C/V CYTO,THINLAYER,RESCR: HCPCS | Performed by: NURSE PRACTITIONER

## 2021-06-03 NOTE — PROGRESS NOTES
Subjective      Paula Wells is a 45 y o  female who presents for annual well woman exam   Last Pap smear 2/10/16 NILM/ HR HPV(-)  Due for pap smear today  Last mammogram 2016 resulted birads 2  Mammogram ordered today  Has not had CRC screening  GI referral placed today  No menses since 2021  No intermenstrual bleeding, spotting, or discharge  Current contraception: abstinence  History of abnormal Pap smear: yes many years ago status post cryo  Family history of uterine or ovarian cancer: no  Regular self breast exam: no  History of abnormal mammogram: no  Family history of breast cancer: no  History of abnormal lipids: no    Menstrual History:  OB History        4    Para   4    Term   4            AB        Living   4       SAB        TAB        Ectopic        Multiple        Live Births                    Menarche age: 14  Patient's last menstrual period was 01/15/2021 (approximate)  Period Cycle (Days): (no menses since 2021)    The following portions of the patient's history were reviewed and updated as appropriate: allergies, current medications, past family history, past medical history, past social history, past surgical history and problem list     Review of Systems  Pertinent items are noted in HPI        Objective      /74   Pulse 93   Ht 5' 7" (1 702 m)   Wt 66 3 kg (146 lb 3 2 oz)   LMP 01/15/2021 (Approximate)   BMI 22 90 kg/m²     General:   alert and oriented, in no acute distress, alert, appears stated age and cooperative   Heart: regular rate and rhythm, S1, S2 normal, no murmur, click, rub or gallop   Lungs: clear to auscultation bilaterally   Abdomen: soft, non-tender, without masses or organomegaly, nondistended and normal bowel sounds   Vulva: normal, Bartholin's, Urethra, [place]'s normal   Vagina: normal mucosa, normal discharge, no palpable nodules   Cervix: no bleeding following Pap, no cervical motion tenderness and no lesions   Uterus: normal size, non-tender, normal shape and consistency   Adnexa: normal adnexa and no mass, fullness, tenderness   Breast: breasts appear normal, no suspicious masses, no skin or nipple changes or axillary nodes  Assessment      @[email or web address] woman@   Plan      All questions answered  Await pap smear results  Breast self exam technique reviewed and patient encouraged to perform self-exam monthly  Contraception: abstinence  Diagnosis explained in detail, including differential   Dietary diary  Discussed healthy lifestyle modifications  Educational material distributed  Follow up in 1 Year for annual exam   Follow up as needed  Mammogram   Thin prep Pap smear  Breast awareness reviewed   Encouraged healthy diet, exercise and lifestyle  Information provided for [address], patient encouraged to establish care  Gardasil vaccine series reviewed  Written information provided  Encouraged social distancing, good hand hygiene, avoidance of crowds and masking  Written information provided about COVID-19    Had COVID vaccine  Will call with results  VBI-    Tobacco Cessation Counseling: Tobacco cessation counseling was not provided  The patient is sincerely urged to quit consumption of tobacco  She is not ready to quit tobacco  The numerous health risks of tobacco consumption were discussed  Patient was provided a referral for tobacco cessation management

## 2021-06-03 NOTE — PATIENT INSTRUCTIONS
COVID-19: Slow the Coronavirus Spread   WHAT YOU NEED TO KNOW:   Why is it important to slow the spread of the 2019 coronavirus? The virus that causes coronavirus disease 2019 (COVID-19) is highly contagious (easily spread)  COVID-19 can cause severe or life-threatening health problems in some people  Signs and symptoms of infection can take up to 14 days to begin, or may not develop at all  This means you or someone around you may have the virus and pass it to others without knowing it  No vaccine is available yet for the new coronavirus  You can help slow the spread of the virus by following worldwide and local guidelines for infection prevention  How does the 2019 coronavirus spread? The virus spreads quickly and easily  You can become infected if you are in contact with a large amount of the virus, even for a short time  You can also become infected by being around a small amount of virus for a long time  The following are ways the virus is thought to spread, but more information may be coming:  · Droplets are the most common way all coronaviruses spread  The virus can travel in droplets that form when a person talks, coughs, or sneezes  Anyone who breathes in the droplets or gets them in his or her eyes can become infected with the virus  Close personal contact with an infected person is thought to be the main way the virus spreads  Close personal contact means you are within 6 feet (2 meters) of the person  · Person-to-person contact can spread the virus  For example, a person with the virus on his or her hands can spread it by shaking hands with someone  At this time, it does not appear that the virus can be passed to a baby during pregnancy or delivery  The baby can be infected after he or she is born through person-to-person contact  The virus also does not appear to spread in breast milk   If you are pregnant or breastfeeding, talk to your healthcare provider or obstetrician about any concerns you have  · The virus can stay on objects and surfaces  A person can get the virus on his or her hands by touching the object or surface  Infection happens if the person then touches his or her eyes or mouth with unwashed hands  It is not yet known how long the virus can stay on an object or surface  That is why it is important to clean all objects and surfaces that are used regularly  · An infected animal may be able to infect a person who touches it  This may happen at live markets or on a farm  How should I wash my hands to help prevent the virus from spreading? Use soap and water  Rub your soapy hands together, lacing your fingers  Wash the front and back of each hand, and in between your fingers  Use the fingers of one hand to scrub under the fingernails of the other hand  Wash for at least 20 seconds  Rinse with warm, running water for several seconds  Then dry your hands with a clean towel or paper towel  Do not touch your eyes, nose, or mouth without washing your hands first  Wash your hands often throughout the day  Use hand  that contains alcohol if soap and water are not available  Teach children how to wash their hands and use hand   How should I cover sneezes and coughs to help prevent the virus from spreading? Turn your face away and cover your mouth and nose with a tissue  Throw the tissue away  Use the bend of your arm if a tissue is not available  Then wash your hands well with soap and water or use hand   Turn your head and cover your face when you are around someone who is sneezing or coughing  Teach children how to cover a cough or sneeze  Remind them to wash their hands  How will social distancing help prevent the virus from spreading? The best way to prevent infection is to avoid anyone who is infected, but this can be hard to do  An infected person can spread the virus before signs or symptoms begin, or even if signs or symptoms never develop  Social distancing means people avoid close personal contact so the virus cannot spread from one person to another  National and local social distancing rules vary  Rules may change over time as restrictions are lifted  Restrictions may return if an outbreak happens where you live  It is important to know and follow all current social distancing rules in your area  The following are general guidelines:  · Limit trips out of your home  You may be able to have food, medicines, and other supplies delivered  If possible, have delivered items left at your door or other area  Try not to have someone hand you an item  You will be so close to the person that the virus can spread between you  · Do not have close physical contact with anyone who does not live in your home  Do not shake hands with, hug, or kiss a person as a greeting  Stand or walk as far from others as possible, especially around anyone who is sneezing or coughing  If you must use public transportation (such as a bus or subway), try to sit or stand away from others  You can stay safely connected with others through phone calls, e-mail messages, social media websites, and video chats  Check in on anyone who may be having a hard time socially distancing, or who lives alone  Ask administrators at nursing homes or long-term care facilities how you can safely communicate with someone living there  · Wear a cloth face covering (mask) when you must go out  A face covering helps prevent the virus from spreading in droplets  Make a covering out of a thick cloth to save medical masks for healthcare workers and first responders  Choose a fabric that holds its shape after it is washed and dried, such as cotton  Check that you can breathe through the fabric when it is folded into several layers  Put the top half of a paper coffee filter in the middle of the fabric to create an extra filter for you  Fold the fabric into a long band   Put each end through a rubber band or hair tie to create ear loops  Fold the ends of the fabric toward the middle  Hold the covering to your face  Adjust it so it covers your nose and mouth completely  Hook the loops onto your ears to keep the covering in place  The following are important points to remember:    ? Do not use a plastic face shield instead of a cloth covering  A face shield will not protect others from droplets  If a face shield must be used, choose one that wraps around both sides of the face and goes below the chin  Do not use disposable shields more than 1 time  Verlean Lennert that can be used again need to be cleaned and disinfected between uses  Do not put a face shield or a cloth covering on a  or infant  These increase the risk for sudden infant death syndrome (SIDS)  ? Continue social distancing while you are out  A face covering does not mean you can have close physical contact with others  You still need to stay at least 6 feet (2 meters) away from others  ? Do not touch your face covering or eyes while you are wearing the covering  Your eyes will not be covered by the cloth  You can be infected if the virus is on your hand and you touch your eyes  Wash your hands with soap and water for 20 seconds or use hand  before you remove your face covering  ? Do not remove your face covering  to talk, sneeze, or cough  ? Wash face coverings often  Wash them in the warmest water the fabric allows  Make sure the fabric is completely dry before you use the face covering again  Only wear clean coverings when you go out  Use a new coffee filter each time  ? Certain individuals should not use face coverings  These include children younger than 2 years and anyone who has breathing problems or cannot remove the covering  ? You can use a clear face covering if someone needs to read your lips  A clear covering is made of cloth but has plastic over the mouth area   This will help the person see your lips more easily  Do not use a plastic face shield instead  ? Do not use face coverings that have breathing valves or vents  Valves or vents on the sides are designed to allow breathed air to go out  This makes breathing easier, but the virus can travel out of the valve or vent and be spread to others  · Only allow medical or other necessary professionals into your home  Wear your face covering, and remind professionals to wear a face covering  Remind them to wash their hands when they arrive and before they leave  Do not  let in anyone who does not live in your home, even if the person is not sick  A person can pass the virus to others before symptoms of COVID-19 begin  Some people never even develop symptoms  Children commonly have mild symptoms or no symptoms  It may be hard to tell a child not to hug or kiss you  Explain that this is how he or she can help you stay healthy  · Do not go to someone else's home unless it is necessary  Do not go over to visit, even if the person is lonely  Only go if you need to help him or her  · Avoid large gatherings and crowds  Gatherings or crowds of 10 or more individuals can cause the virus to spread  Examples of gatherings include parties, sporting events, Congregational services, and conferences  Crowds may form at beaches, yip, and tourist attractions  Protect yourself by staying away from large gatherings and crowds  · Ask your healthcare provider for other ways to have appointments  Some providers offer phone, video, or other types of appointments  You may also be able to get prescriptions for a few months of your medicines at a time  · Stay safe if you must go out to work  You may have a job that can only be done outside your home  Keep physical distance between you and other workers as much as possible  Follow your employer's rules so everyone stays safe  What can I do to prevent an infection in my home? · Wash your hands often    Make it a habit to wash your hands throughout the day  Wash before and after you care for anyone who thinks or knows he or she has COVID-19  Use soap and water  Remember to wash for at least 20 seconds  You can use hand  that contains alcohol if soap and water are not available  · Clean and disinfect your home often  Do this even if you think no one living in or coming to your home is infected with the virus  A person can spread the virus before symptoms begin, or even if no symptoms develop  Clean and disinfect to kill and remove the virus from high-touch surfaces and items  This prevents anyone from getting the virus on his or her hands and becoming infected or spreading the virus to others  The following are general guidelines:    ? Wear disposable gloves while you are cleaning  Do not touch surfaces or items with your bare hands  ? Use disinfecting wipes or a disinfecting solution  You can make a solution by diluting 4 teaspoons of bleach in 1 quart (4 cups) of water  Clean with a sponge or cloth that can be thrown away or washed in hot, soapy water and reused  ? Be careful with   Open windows or have circulating air as you clean  Do not  mix ammonia with bleach  This will create toxic fumes  Read the labels of all products you use  ? Clean and disinfect surfaces throughout your home  Surfaces include countertops, cupboard doors, desks, handrails, doorknobs, toilet handles, faucets, chairs, and light switches  ? Clean and disinfect items well  Objects include keys, phones, computer keyboards and mice, video games, remote controls, and children's toys  ? Clean used dishes and utensils well  Use hot, soapy water or a   ? Wash clothing and bedding well  You can use regular laundry detergent  Follow instructions on the clothing or bedding label  Wash and dry on the warmest settings for the fabric  · Do not share items with anyone    This includes food, drinks, dishes, and eating utensils  · Prepare surfaces any time you are going to bring something into your home  Find space you can use to place items you bring in  Find space you can clean and disinfect well, such as a kitchen countertop  · Safely handle packages delivered to your home  It is not known for sure how long the virus can stay on cardboard or other packaging  You may want to wipe packages with sanitizing wipes  Open the package  Wash your hands  Then move the contents of the package onto a clean surface  Throw the packaging away outside your home  Then wash your hands for at least 20 seconds with soap and water  Clean the surface you laid the package on when you brought it in  Remember to clean and disinfect anything else you touched, such as doorknobs or faucet handles  · Safely handle food you have delivered or  from a restaurant  If possible, have food left at your door or placed into your car  This helps prevent close physical contact with anyone  Open the delivery containers and put the contents into clean dishes or containers  Throw the delivery containers away outside your home  Wash your hands  · Keep anyone who is infected away from others in the home  A person who has COVID-19 needs to stay at home until healthcare providers say it is okay to be around others  This is important, but it can also lead to others in the home becoming infected  If possible, keep the infected person isolated (away from others in the home)  The person should have his or her own dishes and eating utensils  Items the person uses need to be cleaned separately  Anyone who touches the person's items, clothing, or bedding needs to wear gloves that can be thrown away after use  It is important for the person to feel connected with others  Isolation can be lonely  The person may feel anxious or depressed  He or she can safely stay connected through phone calls, video chats, social media, and e-mail messages      How can I stay safe if I must go out of my home? It is not known for sure how long the virus can live on objects and surfaces  Until more is known, follow these and any local recommendations to prevent infection:  · Before you go out:      ? Remember to wash your hands  Wash before you leave your home, so you do not bring the virus with you  Wash again when you get home, after you put away any items you bought  ? Bring disinfecting wipes or hand  with you  Hold a wipe or tissue as you open any doors  Use hand  after you touch elevator buttons or other commonly used surfaces or items  Apply hand  or use a wipe for your hands before you use the keys to unlock or start the car     ? Make a list of items to buy before you go out  This will limit the items you touch in the store  The virus may live on surfaces and objects for up to several days  The more items you handle, the more risk you take of getting the virus on your hands  ? Prepare surfaces for when you return  Find space you can use to place items you bought  Find space you can clean and disinfect well, such as a kitchen countertop  · While you are out:      ? Wear your cloth face covering  Do not touch the covering or your eyes while you are out  ? Use disinfecting wipes to clean items you need to use to shop  Clean shopping cart handles  Clean the area a toddler will sit on or where you will put a baby carrier  Wipe a cart made for children to drive in the store before your toddler gets into it  Wipe the seat, steering wheel, and any part your child must touch  ? If possible, use a debit or credit card to pay  The virus may be able to stay on paper money  You can become infected when you touch the money  · When you get home:      ? Carefully take the face covering off and wash your hands  Put used coverings together  If possible, keep them in a closed laundry bag or basket until you can wash them  ?  Unpack your items safely  Wipe or wash your hands before you open packaging  Put your items on the clean surface you prepared  You can use a disinfecting wipe to clean items before you put them away  Wash fruits and vegetables before you put them away  If possible, scrub the skins with a vegetable brush  ? Clean countertops or other surfaces any shopping bags touched  When you are finished putting your items away, wash your hands  Use disinfecting wipes or a solution to clean surfaces  You can also wipe the vehicle you drove  Wipe the area the bags had been  Wipe anything you touched inside the car  Examples include the steering wheel, seatbelt, inner and outer door handles, turn signals, and radio or other buttons  After items are put away and areas cleaned, remember to wash your hands  When should I call my doctor? · You have questions about social distancing or ways to keep your home and family safe  · You have questions or concerns about the new coronavirus or COVID-19  Where can I find more information? · Centers for Disease Control and Prevention  [address] , [address]  Phone: 8- 221 - 6384013  Phone: 7- 306 - [phone]  Web Address: [employer]     CARE AGREEMENT:   You have the right to help plan your care  Learn about your health condition and how it may be treated  Discuss treatment options with your healthcare providers to decide what care you want to receive  You always have the right to refuse treatment  The above information is an  only  It is not intended as medical advice for individual conditions or treatments  Talk to your doctor, nurse or pharmacist before following any medical regimen to see if it is safe and effective for you  © Copyright [address] Information is for End User's use only and may not be sold, redistributed or otherwise used for commercial purposes   All illustrations and images included in CareNotes® are the copyrighted property of [employer]  or [address] [place] [address]   WHAT YOU NEED TO KNOW:   What do I need to know about a mammogram?  A mammogram is an x-ray of your breasts to screen for breast cancer  Your healthcare provider will talk with you about the benefits and risks of mammograms  Together you will decide when you will get your first mammogram  This is usually at age 39 or 47  Your provider may recommend you start at 36 or younger if your risk for breast cancer is high  Mammograms usually continue every 1 to 2 years until age 76  How do I prepare for a mammogram?   · Do not use deodorant, powder, lotion, or perfume  These products may cause particles to appear on your mammogram     · Wear a 2-piece outfit  · If your breasts are tender before your monthly period, do not have a mammogram during this time  Schedule your mammogram for 1 week after your period ends  · If you are breastfeeding, express as much milk as possible before the mammogram     · Bring a list of the dates and places of your past mammograms and other breast tests or treatments  What will happen during a mammogram?  A top view and a side view x-ray are usually done for each breast  Tell healthcare providers if you have breast implants or breast problems before you have your mammogram  You may need extra x-rays of each breast   · You will be given a hospital gown  Take off your clothes from the waist up  Wear the hospital gown so that it opens in the front  · You will sit or stand next to a small x-ray machine  The healthcare provider will help you place one of your breasts on the x-ray plate  Your arm and breast will be moved until your breast is in the correct position  · Your breast will be gently pressed between 2 plates for a few seconds while the x-ray is taken  This may be uncomfortable  · You will be asked to hold your breath while the x-ray is taken   Another x-ray will be taken of the same breast after the position of the x-ray machine has been changed  · Your other breast will be x-rayed the same way  What will happen after my mammogram?  Your breasts may feel tender for a short time after the mammogram  You may go back to your regular activities  Ask your healthcare provider when you should receive the results of your mammogram   What are the risks of a mammogram?  You will be exposed to a small amount of radiation  Some breast cancers may not show up on mammograms  When should I call my doctor? · You do not receive your results when expected  · You have questions or concerns about the mammogram     CARE AGREEMENT:   You have the right to help plan your care  Learn about your health condition and how it may be treated  Discuss treatment options with your healthcare providers to decide what care you want to receive  You always have the right to refuse treatment  The above information is an  only  It is not intended as medical advice for individual conditions or treatments  Talk to your doctor, nurse or pharmacist before following any medical regimen to see if it is safe and effective for you  © Copyright [address] Information is for End User's use only and may not be sold, redistributed or otherwise used for commercial purposes  All illustrations and images included in CareNotes® are the copyrighted property of [employer]  or [facility]   Breast Self Exam for Women   WHAT YOU NEED TO KNOW:   What is a breast self-exam (BSE)? A BSE is a way to check your breasts for lumps and other changes  Regular BSEs can help you know how your breasts normally look and feel  Most breast lumps or changes are not cancer, but you should always have them checked by a healthcare provider  Your healthcare provider can also watch you do a BSE and can tell you if you are doing your BSE correctly  Why should I do a BSE? Breast cancer is the most common type of cancer in women   Even if you have mammograms, you may still want to do a BSE regularly  If you know how your breasts normally feel and look, it may help you know when to contact your healthcare provider  Mammograms can miss some cancers  You may find a lump during a BSE that did not show up on a mammogram   When should I do a BSE? If you have periods, you may want to do your BSE 1 week after your period ends  This is the time when your breasts may be the least swollen, lumpy, or tender  You can do regular BSEs even if you are breastfeeding or have breast implants  How should I do a BSE? · Look at your breasts in a mirror  Look at the size and shape of each breast and nipple  Check for swelling, lumps, dimpling, scaly skin, or other skin changes  Look for nipple changes, such as a nipple that is painful or beginning to pull inward  Gently squeeze both nipples and check to see if fluid (that is not breast milk) comes out of them  If you find any of these or other breast changes, contact your healthcare provider  Check your breasts while you sit or  the following 3 positions:    ? Hang your arms down at your sides  ? Raise your hands and join them behind your head  ? Put firm pressure with your hands on your hips  Bend slightly forward while you look at your breasts in the mirror  · Lie down and feel your breasts  When you lie down, your breast tissue spreads out evenly over your chest  This makes it easier for you to feel for lumps and anything that may not be normal for your breasts  Do a BSE on one breast at a time  ? Place a small pillow or towel under your left shoulder  Put your left arm behind your head  ? Use the 3 middle fingers of your right hand  Use your fingertip pads, on the top of your fingers  Your fingertip pad is the most sensitive part of your finger  ? Use small circles to feel your breast tissue  Use your fingertip pads to make dime-sized, overlapping circles on your breast and armpits   Use light, medium, and firm pressure  First, press lightly  Second, press with medium pressure to feel a little deeper into the breast  Last, use firm pressure to feel deep within your breast     ? Examine your entire breast area  Examine the breast area from above the breast to below the breast where you feel only ribs  Make small circles with your fingertips, starting in the middle of your armpit  Make circles going up and down the breast area  Continue toward your breast and all the way across it  Examine the area from your armpit all the way over to the middle of your chest (breastbone)  Stop at the middle of your chest     ? Move the pillow or towel to your right shoulder, and put your right arm behind your head  Use the 3 fingertip pads of your left hand, and repeat the above steps to do a BSE on your right breast   What else can I do to check for breast problems or cancer? Talk to your healthcare provider about mammograms  A mammogram is an x-ray of your breasts to screen for breast cancer or other problems  Your provider can tell you the benefits and risks of mammograms  The first mammogram is usually at age 39 or 47  Your provider may recommend you start at 36 or younger if your risk for breast cancer is high  Mammograms usually continue every 1 to 2 years until age 76  When should I call my doctor? · You find any lumps or changes in your breasts  · You have breast pain or fluid coming from your nipples  · You have questions or concerns or concerns about your condition or care  CARE AGREEMENT:   You have the right to help plan your care  Learn about your health condition and how it may be treated  Discuss treatment options with your healthcare providers to decide what care you want to receive  You always have the right to refuse treatment  The above information is an  only  It is not intended as medical advice for individual conditions or treatments   Talk to your doctor, nurse or pharmacist before following any medical regimen to see if it is safe and effective for you  © Copyright [employer] 2021 Information is for End User's use only and may not be sold, redistributed or otherwise used for commercial purposes  All illustrations and images included in CareNotes® are the copyrighted property of A D A M , Inc  or [address]  Pap Smear   GENERAL INFORMATION:   What is a Pap smear? A Pap smear, or Pap test, is a procedure to check your cervix for abnormal cells  The cervix is the narrow opening at the bottom of your uterus  The cervix meets the top part of the vagina  How do I prepare for a Pap smear? The best time to schedule the test is right after your period stops  Do not have a Pap smear during your monthly period  Do not have intercourse or put anything in your vagina for 24 hours before your test    What will happen during a Pap smear? · You will lie on your back and place your feet on footrests called stirrups  Your caregiver will gently insert a device called a speculum into your vagina  The speculum is used to spread the walls of your vagina so he can see your cervix  He will use a thin brush or cotton swab to collect cells from the inside of your cervix  · Your caregiver will also collect cells from the surface of your cervix with a plastic or wooden tool called a spatula  He may also gently scrape the upper part of your vagina for a sample  The samples are placed in a container with liquid or on a glass slide  They are sent to a lab and examined for abnormal cells  How often do I need a Pap smear? Pap smears are usually done every 1 to 3 years   You may need a Pap smear more often if you have any of the following:  · Positive test result for the human papillomavirus (HPV)    · Cervical intraepithelial neoplasm or cervical cancer    · HIV    · A weak immune system    · Exposure to diethylstilbestrol (REBA) medicine when your mother was pregnant with you  CARE AGREEMENT:   You have the right to help plan your care  Learn about your health condition and how it may be treated  Discuss treatment options with your caregivers to decide what care you want to receive  You always have the right to refuse treatment  The above information is an  only  It is not intended as medical advice for individual conditions or treatments  Talk to your doctor, nurse or pharmacist before following any medical regimen to see if it is safe and effective for you  © 2014 [address] is for End User's use only and may not be sold, redistributed or otherwise used for commercial purposes  All illustrations and images included in CareNotes® are the copyrighted property of [employer]  or Zay Roth

## 2021-06-05 LAB
HPV HR 12 DNA CVX QL NAA+PROBE: NEGATIVE
HPV16 DNA CVX QL NAA+PROBE: NEGATIVE
HPV18 DNA CVX QL NAA+PROBE: NEGATIVE

## 2021-06-08 ENCOUNTER — TELEPHONE (OUTPATIENT)
Dept: OBGYN CLINIC | Facility: CLINIC | Age: 51
End: 2021-06-08

## 2021-06-08 LAB
LAB AP GYN PRIMARY INTERPRETATION: NORMAL
Lab: NORMAL

## 2021-06-08 NOTE — TELEPHONE ENCOUNTER
----- Message from [address] sent at 6/8/2021 12:09 PM EDT -----  Please let patient know Pap smear is negative for any cell changes and negative for human papilloma virus    Thank you

## 2021-07-12 ENCOUNTER — HOSPITAL ENCOUNTER (OUTPATIENT)
Dept: RADIOLOGY | Age: 51
Discharge: HOME | End: 2021-07-12
Payer: COMMERCIAL

## 2021-07-12 VITALS — HEIGHT: 67 IN | WEIGHT: 146 LBS | BODY MASS INDEX: 22.91 KG/M2

## 2021-07-12 DIAGNOSIS — Z12.31 SCREENING MAMMOGRAM, ENCOUNTER FOR: ICD-10-CM

## 2021-07-12 PROCEDURE — 77067 SCR MAMMO BI INCL CAD: CPT

## 2021-07-12 PROCEDURE — 77063 BREAST TOMOSYNTHESIS BI: CPT

## 2021-07-13 ENCOUNTER — TELEPHONE (OUTPATIENT)
Dept: OBGYN CLINIC | Facility: CLINIC | Age: 51
End: 2021-07-13

## 2021-07-13 NOTE — TELEPHONE ENCOUNTER
----- Message from [employer] sent at 7/13/2021  9:00 AM EDT -----  Please call patient mammogram resulted BiRads 1  Heterogeneously dense  Recommendation is repeat mammogram in 1 year   With next mammogram would recommend ABUS (US of both breasts)     Thank you

## 2021-07-13 NOTE — TELEPHONE ENCOUNTER
----- Message from [email or web address] sent at 7/13/2021  9:00 AM EDT -----  Please call patient mammogram resulted BiRads 1  Heterogeneously dense  Recommendation is repeat mammogram in 1 year   With next mammogram would recommend ABUS (US of both breasts)     Thank you

## 2021-08-19 ENCOUNTER — OFFICE VISIT (OUTPATIENT)
Dept: INTERNAL MEDICINE CLINIC | Facility: CLINIC | Age: 51
End: 2021-08-19

## 2021-08-19 VITALS
SYSTOLIC BLOOD PRESSURE: 116 MMHG | HEART RATE: 71 BPM | WEIGHT: 143.2 LBS | BODY MASS INDEX: 22.47 KG/M2 | OXYGEN SATURATION: 98 % | DIASTOLIC BLOOD PRESSURE: 79 MMHG | TEMPERATURE: 98.3 F | HEIGHT: 67 IN

## 2021-08-19 DIAGNOSIS — Z00.00 ROUTINE ADULT HEALTH MAINTENANCE: Primary | ICD-10-CM

## 2021-08-19 DIAGNOSIS — Z23 NEED FOR PNEUMOCOCCAL VACCINATION: ICD-10-CM

## 2021-08-19 DIAGNOSIS — Z72.0 TOBACCO ABUSE: ICD-10-CM

## 2021-08-19 PROCEDURE — 99386 PREV VISIT NEW AGE 40-64: CPT | Performed by: PHYSICIAN ASSISTANT

## 2021-08-19 PROCEDURE — 90732 PPSV23 VACC 2 YRS+ SUBQ/IM: CPT

## 2021-08-19 PROCEDURE — 90471 IMMUNIZATION ADMIN: CPT

## 2021-08-19 RX ORDER — VARENICLINE TARTRATE
KIT
Qty: 53 TABLET | Refills: 0 | Status: SHIPPED | OUTPATIENT
Start: 2021-08-19 | End: 2021-10-01
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
## 2021-08-20 PROBLEM — M54.2 NECK PAIN: Status: RESOLVED | Noted: 2018-08-29 | Resolved: 2021-08-20

## 2021-08-20 PROBLEM — Z72.0 TOBACCO ABUSE: Status: ACTIVE | Noted: 2021-08-20
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
## 2021-08-23 ENCOUNTER — TELEPHONE (OUTPATIENT)
Dept: GASTROENTEROLOGY | Facility: HOSPITAL | Age: 51
End: 2021-08-23

## 2021-08-24 ENCOUNTER — HOSPITAL ENCOUNTER (OUTPATIENT)
Dept: GASTROENTEROLOGY | Facility: HOSPITAL | Age: 51
Setting detail: OUTPATIENT SURGERY
Discharge: HOME | End: 2021-08-24
Attending: INTERNAL MEDICINE
Payer: COMMERCIAL

## 2021-08-24 ENCOUNTER — ANESTHESIA (OUTPATIENT)
Dept: GASTROENTEROLOGY | Facility: HOSPITAL | Age: 51
End: 2021-08-24

## 2021-08-24 ENCOUNTER — ANESTHESIA EVENT (OUTPATIENT)
Dept: GASTROENTEROLOGY | Facility: HOSPITAL | Age: 51
End: 2021-08-24

## 2021-08-24 VITALS
DIASTOLIC BLOOD PRESSURE: 71 MMHG | OXYGEN SATURATION: 98 % | SYSTOLIC BLOOD PRESSURE: 107 MMHG | HEART RATE: 71 BPM | RESPIRATION RATE: 20 BRPM | TEMPERATURE: 96.3 F

## 2021-08-24 DIAGNOSIS — Z12.11 SPECIAL SCREENING FOR MALIGNANT NEOPLASMS, COLON: ICD-10-CM

## 2021-08-24 PROCEDURE — 45378 DIAGNOSTIC COLONOSCOPY: CPT | Performed by: INTERNAL MEDICINE

## 2021-08-24 RX ORDER — SODIUM CHLORIDE 9 MG/ML
INJECTION, SOLUTION INTRAVENOUS CONTINUOUS PRN
Status: DISCONTINUED | OUTPATIENT
Start: 2021-08-24 | End: 2021-08-24

## 2021-08-24 RX ORDER — PROPOFOL 10 MG/ML
INJECTION, EMULSION INTRAVENOUS AS NEEDED
Status: DISCONTINUED | OUTPATIENT
Start: 2021-08-24 | End: 2021-08-24

## 2021-08-24 RX ADMIN — PROPOFOL 50 MG: 10 INJECTION, EMULSION INTRAVENOUS at 14:38

## 2021-08-24 RX ADMIN — PROPOFOL 80 MG: 10 INJECTION, EMULSION INTRAVENOUS at 14:33

## 2021-08-24 RX ADMIN — SODIUM CHLORIDE: 9 INJECTION, SOLUTION INTRAVENOUS at 14:33

## 2021-08-24 NOTE — ANESTHESIA PREPROCEDURE EVALUATION
Procedure:  COLONOSCOPY    Relevant Problems   No relevant active problems        Physical Exam    Airway    Mallampati score: II  TM Distance: >3 FB  Neck ROM: full     Dental   No notable dental hx     Cardiovascular  Cardiovascular exam normal    Pulmonary  Pulmonary exam normal     Other Findings        Anesthesia Plan  ASA Score- 2     Anesthesia Type- IV sedation with anesthesia with ASA Monitors  Additional Monitors:   Airway Plan:           Plan Factors-Exercise tolerance (METS): >4 METS  Chart reviewed  Existing labs reviewed  Patient summary reviewed  Patient is a current smoker  Induction- intravenous  Postoperative Plan-     Informed Consent- Anesthetic plan and risks discussed with patient

## 2021-08-24 NOTE — ANESTHESIA POSTPROCEDURE EVALUATION
Post-Op Assessment Note    CV Status:  Stable  Pain Score: 0    Pain management: adequate     Mental Status:  Sleepy   Hydration Status:  Euvolemic   PONV Controlled:  None   Airway Patency:  Patent      Post Op Vitals Reviewed: Yes      Staff: Anesthesiologist, CRNA         No complications documented      BP   97/57   Temp   96 3   Pulse  62   Resp   12   SpO2   97

## 2021-10-01 ENCOUNTER — OFFICE VISIT (OUTPATIENT)
Dept: INTERNAL MEDICINE CLINIC | Facility: CLINIC | Age: 51
End: 2021-10-01

## 2021-10-01 VITALS
DIASTOLIC BLOOD PRESSURE: 76 MMHG | TEMPERATURE: 97.8 F | BODY MASS INDEX: 22.13 KG/M2 | HEART RATE: 76 BPM | WEIGHT: 141 LBS | SYSTOLIC BLOOD PRESSURE: 122 MMHG | HEIGHT: 67 IN

## 2021-10-01 DIAGNOSIS — G89.29 CHRONIC PAIN OF BOTH KNEES: ICD-10-CM

## 2021-10-01 DIAGNOSIS — Z72.0 TOBACCO ABUSE: Primary | ICD-10-CM

## 2021-10-01 DIAGNOSIS — M25.552 CHRONIC PAIN OF BOTH HIPS: ICD-10-CM

## 2021-10-01 DIAGNOSIS — G89.29 CHRONIC PAIN OF BOTH HIPS: ICD-10-CM

## 2021-10-01 DIAGNOSIS — M25.562 CHRONIC PAIN OF BOTH KNEES: ICD-10-CM

## 2021-10-01 DIAGNOSIS — R22.1 LOCALIZED SWELLING, MASS AND LUMP, NECK: ICD-10-CM

## 2021-10-01 DIAGNOSIS — M25.561 CHRONIC PAIN OF BOTH KNEES: ICD-10-CM

## 2021-10-01 DIAGNOSIS — M25.551 CHRONIC PAIN OF BOTH HIPS: ICD-10-CM

## 2021-10-01 PROCEDURE — 99214 OFFICE O/P EST MOD 30 MIN: CPT | Performed by: PHYSICIAN ASSISTANT

## 2021-10-01 RX ORDER — DICLOFENAC SODIUM 75 MG/1
75 TABLET, DELAYED RELEASE ORAL 2 TIMES DAILY PRN
Qty: 30 TABLET | Refills: 2 | Status: SHIPPED | OUTPATIENT
Start: 2021-10-01 | End: 2022-04-14 | Stop reason: SDUPTHER

## 2021-10-01 RX ORDER — NICOTINE 21 MG/24HR
1 PATCH, TRANSDERMAL 24 HOURS TRANSDERMAL EVERY 24 HOURS
Qty: 28 PATCH | Refills: 1 | Status: SHIPPED | OUTPATIENT
Start: 2021-10-01

## 2021-10-01 RX ORDER — BUPROPION HYDROCHLORIDE 150 MG/1
TABLET, EXTENDED RELEASE ORAL
Qty: 60 TABLET | Refills: 4 | Status: SHIPPED | OUTPATIENT
Start: 2021-10-01

## 2021-10-14 ENCOUNTER — TELEPHONE (OUTPATIENT)
Dept: INTERNAL MEDICINE CLINIC | Facility: CLINIC | Age: 51
End: 2021-10-14

## 2021-10-14 DIAGNOSIS — R22.1 NECK MASS: Primary | ICD-10-CM

## 2021-10-14 NOTE — TELEPHONE ENCOUNTER
Called to do Peer to peer evaluation  Request was declined until U/S completed  Based on result of U/S may then be candidate for MRI or CT bsed on Radiologist recommendation  Will place order for U/S  (does not require prior auth)  Please notify central scheduling and patient of POC

## 2021-10-14 NOTE — TELEPHONE ENCOUNTER
This patient was scheduled for CT NECK  The information I submitted was not enough to get it approved, so [facility] is requesting a [id number]  If you are able to do so, the number is below  If you wish to withdraw this request let us know so we can call central scheduling to cancel their upcoming appointment  CHIN:  [phone] Option #3  Tracking [id number]  [address]  ID#: [id number]              Attending NPI [id number] Fauzia Cisneros case is time sensitive please respond within 24hrs  Thank you

## 2021-10-29 ENCOUNTER — TELEPHONE (OUTPATIENT)
Dept: OTHER | Facility: OTHER | Age: 51
End: 2021-10-29

## 2022-04-14 ENCOUNTER — APPOINTMENT (OUTPATIENT)
Dept: LAB | Facility: CLINIC | Age: 52
End: 2022-04-14
Payer: MEDICARE

## 2022-04-14 ENCOUNTER — OFFICE VISIT (OUTPATIENT)
Dept: INTERNAL MEDICINE CLINIC | Facility: CLINIC | Age: 52
End: 2022-04-14

## 2022-04-14 VITALS
HEART RATE: 82 BPM | BODY MASS INDEX: 22.15 KG/M2 | TEMPERATURE: 97.7 F | SYSTOLIC BLOOD PRESSURE: 111 MMHG | WEIGHT: 141.4 LBS | DIASTOLIC BLOOD PRESSURE: 78 MMHG

## 2022-04-14 DIAGNOSIS — G89.29 CHRONIC PAIN OF BOTH HIPS: ICD-10-CM

## 2022-04-14 DIAGNOSIS — M25.552 CHRONIC PAIN OF BOTH HIPS: ICD-10-CM

## 2022-04-14 DIAGNOSIS — M25.561 CHRONIC PAIN OF BOTH KNEES: ICD-10-CM

## 2022-04-14 DIAGNOSIS — R22.1 LOCALIZED SWELLING, MASS AND LUMP, NECK: Primary | ICD-10-CM

## 2022-04-14 DIAGNOSIS — G89.29 CHRONIC PAIN OF BOTH KNEES: ICD-10-CM

## 2022-04-14 DIAGNOSIS — M25.562 CHRONIC PAIN OF BOTH KNEES: ICD-10-CM

## 2022-04-14 DIAGNOSIS — M25.551 CHRONIC PAIN OF BOTH HIPS: ICD-10-CM

## 2022-04-14 DIAGNOSIS — R22.1 LOCALIZED SWELLING, MASS AND LUMP, NECK: ICD-10-CM

## 2022-04-14 LAB
ALBUMIN SERPL BCP-MCNC: 4.4 G/DL (ref 3.5–5)
ALP SERPL-CCNC: 98 U/L (ref 46–116)
ALT SERPL W P-5'-P-CCNC: 17 U/L (ref 12–78)
ANION GAP SERPL CALCULATED.3IONS-SCNC: 2 MMOL/L (ref 4–13)
AST SERPL W P-5'-P-CCNC: 10 U/L (ref 5–45)
BASOPHILS # BLD AUTO: 0.04 THOUSANDS/ΜL (ref 0–0.1)
BASOPHILS NFR BLD AUTO: 1 % (ref 0–1)
BILIRUB SERPL-MCNC: 0.85 MG/DL (ref 0.2–1)
BUN SERPL-MCNC: 10 MG/DL (ref 5–25)
CALCIUM SERPL-MCNC: 9.9 MG/DL (ref 8.3–10.1)
CHLORIDE SERPL-SCNC: 107 MMOL/L (ref 100–108)
CO2 SERPL-SCNC: 29 MMOL/L (ref 21–32)
CREAT SERPL-MCNC: 0.88 MG/DL (ref 0.6–1.3)
EOSINOPHIL # BLD AUTO: 0.2 THOUSAND/ΜL (ref 0–0.61)
EOSINOPHIL NFR BLD AUTO: 3 % (ref 0–6)
ERYTHROCYTE [DISTWIDTH] IN BLOOD BY AUTOMATED COUNT: 12.3 % (ref 11.6–15.1)
GFR SERPL CREATININE-BSD FRML MDRD: 75 ML/MIN/1.73SQ M
GLUCOSE P FAST SERPL-MCNC: 97 MG/DL (ref 65–99)
HCT VFR BLD AUTO: 42 % (ref 34.8–46.1)
HGB BLD-MCNC: 13.9 G/DL (ref 11.5–15.4)
IMM GRANULOCYTES # BLD AUTO: 0.02 THOUSAND/UL (ref 0–0.2)
IMM GRANULOCYTES NFR BLD AUTO: 0 % (ref 0–2)
LYMPHOCYTES # BLD AUTO: 2.94 THOUSANDS/ΜL (ref 0.6–4.47)
LYMPHOCYTES NFR BLD AUTO: 39 % (ref 14–44)
MCH RBC QN AUTO: 30.3 PG (ref 26.8–34.3)
MCHC RBC AUTO-ENTMCNC: 33.1 G/DL (ref 31.4–37.4)
MCV RBC AUTO: 92 FL (ref 82–98)
MONOCYTES # BLD AUTO: 0.65 THOUSAND/ΜL (ref 0.17–1.22)
MONOCYTES NFR BLD AUTO: 9 % (ref 4–12)
NEUTROPHILS # BLD AUTO: 3.72 THOUSANDS/ΜL (ref 1.85–7.62)
NEUTS SEG NFR BLD AUTO: 48 % (ref 43–75)
NRBC BLD AUTO-RTO: 0 /100 WBCS
PLATELET # BLD AUTO: 286 THOUSANDS/UL (ref 149–390)
PMV BLD AUTO: 10.1 FL (ref 8.9–12.7)
POTASSIUM SERPL-SCNC: 3.9 MMOL/L (ref 3.5–5.3)
PROT SERPL-MCNC: 7.9 G/DL (ref 6.4–8.2)
RBC # BLD AUTO: 4.58 MILLION/UL (ref 3.81–5.12)
SODIUM SERPL-SCNC: 138 MMOL/L (ref 136–145)
TSH SERPL DL<=0.05 MIU/L-ACNC: 1.88 UIU/ML (ref 0.45–4.5)
WBC # BLD AUTO: 7.57 THOUSAND/UL (ref 4.31–10.16)

## 2022-04-14 PROCEDURE — 87389 HIV-1 AG W/HIV-1&-2 AB AG IA: CPT

## 2022-04-14 PROCEDURE — 80053 COMPREHEN METABOLIC PANEL: CPT

## 2022-04-14 PROCEDURE — 86803 HEPATITIS C AB TEST: CPT

## 2022-04-14 PROCEDURE — 36415 COLL VENOUS BLD VENIPUNCTURE: CPT

## 2022-04-14 PROCEDURE — 99213 OFFICE O/P EST LOW 20 MIN: CPT | Performed by: INTERNAL MEDICINE

## 2022-04-14 PROCEDURE — 85025 COMPLETE CBC W/AUTO DIFF WBC: CPT

## 2022-04-14 PROCEDURE — 84443 ASSAY THYROID STIM HORMONE: CPT

## 2022-04-14 RX ORDER — DICLOFENAC SODIUM 75 MG/1
75 TABLET, DELAYED RELEASE ORAL 2 TIMES DAILY PRN
Qty: 30 TABLET | Refills: 2 | Status: SHIPPED | OUTPATIENT
Start: 2022-04-14

## 2022-04-14 NOTE — PROGRESS NOTES
[facility]  INTERNAL MEDICINE OFFICE VISIT     PATIENT INFORMATION     Rukhsana Leal   45 y o  female   MRN: [id number]    ASSESSMENT/PLAN     Diagnoses and all orders for this visit:    Localized swelling, mass and lump, neck  -     Ambulatory Referral to Otolaryngology; Future  -     CBC and differential; Future  -     Comprehensive metabolic panel; Future  -     Hepatitis C antibody; Future  -     HIV 1/2 Antigen/Antibody (4th Generation) w Reflex [facility]; Future  -     TSH, 3rd generation with Free T4 reflex; Future    Chronic pain of both knees  -     diclofenac (VOLTAREN) 75 mg EC tablet; Take 1 tablet (75 mg total) by mouth 2 (two) times a day as needed (hip and knee pain) Take with food    Chronic pain of both hips  -     diclofenac (VOLTAREN) 75 mg EC tablet; Take 1 tablet (75 mg total) by mouth 2 (two) times a day as needed (hip and knee pain) Take with food      Schedule a follow-up appointment in 3 months   - CBC, CMP, TSH + free T4, HIV, HCV  - Patient should get head/neck US  - Establish with ENT  - Refilled Voltaren she uses PRN for arthritis    HEALTH MAINTENANCE     Immunization History   Administered Date(s) Administered    COVID-19 MODERNA VACC 0 5 ML IM 03/22/2021, 04/21/2021    INFLUENZA 02/23/2016    Influenza, injectable, quadrivalent, preservative free 0 5 mL 11/06/2019, 10/17/2020    Influenza, seasonal, injectable 02/23/2016    Pneumococcal Polysaccharide PPV23 08/19/2021    Tdap 02/23/2016     CHIEF COMPLAINT     Chief Complaint   Patient presents with    Neck Swelling     about a year, small lump      HISTORY OF PRESENT ILLNESS     Patient presenting as same day to follow up about neck mass  Was seen by Crystal Worthington in 10/2021 for same neck mass - at the time it had been present for about 1 year  CT head and neck was ordered, but denied by insurance  Head and neck U  S  was also ordered but patient was never informed so it has not been done     Today she came in because she notices that it is growing slightly and she has been having infrequent brief pain associated with the mass  ROS positive for growth of the mass and the aforementioned pain  She otherwise denies dysphagia, odynophagia, dyspnea, chest pain, cough, changes in vision, lightheadedness/dizziness, palpitations, heat and cold intolerance, changes in weight, fevers/chills, night sweats, lower extremity edema, rhinorrhea, sore throat  REVIEW OF SYSTEMS     Review of Systems - see HPI    OBJECTIVE     Vitals:    04/14/22 1204   BP: 111/78   BP Location: Left arm   Patient Position: Sitting   Cuff Size: Standard   Pulse: 82   Temp: 97 7 °F (36 5 °C)   TempSrc: Temporal   Weight: 64 1 kg (141 lb 6 4 oz)     Physical Exam  Vitals reviewed  Constitutional:       General: She is not in acute distress  Appearance: Normal appearance  She is well-developed  HENT:      Head: Normocephalic and atraumatic  Eyes:      Conjunctiva/sclera: Conjunctivae normal    Neck:     Cardiovascular:      Rate and Rhythm: Normal rate and regular rhythm  Heart sounds: Normal heart sounds  No murmur heard  Pulmonary:      Effort: Pulmonary effort is normal  No respiratory distress  Breath sounds: Normal breath sounds  Abdominal:      General: Bowel sounds are normal  There is no distension  Palpations: Abdomen is soft  Tenderness: There is no abdominal tenderness  Musculoskeletal:      Cervical back: Full passive range of motion without pain and neck supple  Lymphadenopathy:      Cervical: No cervical adenopathy  Skin:     General: Skin is warm and dry  Neurological:      Mental Status: She is alert and oriented to person, place, and time  Psychiatric:         Mood and Affect: Mood normal          Thought Content:  Thought content normal        CURRENT MEDICATIONS     Current Outpatient Medications:     diclofenac (VOLTAREN) 75 mg EC tablet, Take 1 tablet (75 mg total) by mouth 2 (two) times a day as needed (hip and knee pain) Take with food, Disp: 30 tablet, Rfl: 2    nicotine (NICODERM CQ) 21 mg/24 hr TD 24 hr patch, Place 1 patch on the skin every 24 hours, Disp: 28 patch, Rfl: 1    buPROPion (Zyban) 150 MG 12 hr tablet, Take 1 tab PO QHS x 3 nights, then 1 tab PO BID (Patient not taking: Reported on 2022 ), Disp: 60 tablet, Rfl: 4    PAST MEDICAL & SURGICAL HISTORY     Past Medical History:   Diagnosis Date    Arthritis     Hip    Hep C w/o coma, chronic ([facility] [address] )     Patient states completed treatment     Past Surgical History:   Procedure Laterality Date    BREAST BIOPSY Left 2011    u/s guided core bx     SECTION      ,     CHOLECYSTECTOMY      GYNECOLOGIC CRYOSURGERY      Cervix    LIVER BIOPSY  2016    Last assessed     SOCIAL & FAMILY HISTORY     Social History     Socioeconomic History    Marital status: Legally      Spouse name: Not on file    Number of children: 4    Years of education: Not on file    Highest education level: Not on file   Occupational History    Not on file   Tobacco Use    Smoking status: Current Every Day Smoker     Packs/day: 1 00    Smokeless tobacco: Never Used    Tobacco comment: more then half a pack   Vaping Use    Vaping Use: Never used   Substance and Sexual Activity    Alcohol use: No    Drug use: No     Comment: heroin-last     Sexual activity: Not Currently     Partners: Male   Other Topics Concern    Not on file   Social History Narrative    Caffeine use - 2 cups a day    4 children     Exercise - walking    Soda - 2 cans a day    Work - call center employee      Social Determinants of Health     Financial Resource Strain: Low Risk     Difficulty of Paying Living Expenses: Not hard at all   Food Insecurity: No Food Insecurity    Worried About Running Out of Food in the Last Year: Never true    Dashawn of Food in the Last Year: Never true   Transportation Needs: No Transportation Needs    Lack of Transportation (Medical): No    Lack of Transportation (Non-Medical): No   Physical Activity: Inactive    Days of Exercise per Week: 0 days    Minutes of Exercise per Session: 0 min   Stress: Stress Concern Present    Feeling of Stress :  To some extent   Social Connections: Socially Isolated    Frequency of Communication with Friends and Family: More than three times a week    Frequency of Social Gatherings with Friends and Family: Once a week    Attends Restoration Services: Never    Active Member of Clubs or Organizations: No    Attends Club or Organization Meetings: Never    Marital Status:    Intimate Partner Violence: Not At Risk    Fear of Current or Ex-Partner: No    Emotionally Abused: No    Physically Abused: No    Sexually Abused: No   Housing Stability: [address] Unable to Pay for Housing in the Last Year: No    Number of [place] in the Last Year: 1    Unstable Housing in the Last Year: No     Social History     Substance and Sexual Activity   Alcohol Use No     Social History     Substance and Sexual Activity   Drug Use No    Comment: heroin-last 2003     Social History     Tobacco Use   Smoking Status Current Every Day Smoker    Packs/day: 1 00   Smokeless Tobacco Never Used   Tobacco Comment    more then half a pack     Family History   Problem Relation Age of Onset    Diabetes Mother     Arthritis Mother     Hypertension Mother     Heart disease Father     Lung cancer Father 61    Diabetes Maternal Grandmother     Breast cancer Maternal Grandmother 79    Coronary artery disease Maternal Grandfather     Heart attack Paternal Grandfather     Heart disease Paternal Grandfather     No Known Problems Brother     Asthma Daughter         childhood    No Known Problems Son     No Known Problems Paternal Grandmother     No Known Problems Daughter     No Known Problems Daughter     Stomach cancer Paternal Aunt 61    Skin cancer Paternal Uncle                ==  Adiel Da Silvahermila, DO  PGY-1  [address] [facility] [address] - [place] , [address] [address] [facility], [facility]  Office: [phone]  Fax: [phone]

## 2022-04-14 NOTE — PATIENT INSTRUCTIONS
Please get the blood work done as soon as you can  We will call you with the results  Please get the ultrasound of your neck done  We will call you with the results  Please follow up with the head and neck surgeon

## 2022-04-15 LAB
HCV AB SER QL: ABNORMAL
HIV 1+2 AB+HIV1 P24 AG SERPL QL IA: NORMAL

## 2022-04-18 ENCOUNTER — HOSPITAL ENCOUNTER (OUTPATIENT)
Dept: RADIOLOGY | Facility: HOSPITAL | Age: 52
Discharge: HOME | End: 2022-04-18
Attending: INTERNAL MEDICINE
Payer: MEDICARE

## 2022-04-18 DIAGNOSIS — R22.1 NECK MASS: ICD-10-CM

## 2022-04-18 PROCEDURE — 76536 US EXAM OF HEAD AND NECK: CPT

## 2022-06-23 ENCOUNTER — APPOINTMENT (OUTPATIENT)
Dept: LAB | Facility: HOSPITAL | Age: 52
End: 2022-06-23
Payer: MEDICARE

## 2022-06-23 DIAGNOSIS — Z01.818 OTHER SPECIFIED PRE-OPERATIVE EXAMINATION: ICD-10-CM

## 2022-06-23 LAB
ABO GROUP BLD: NORMAL
ALBUMIN SERPL BCP-MCNC: 4 G/DL (ref 3.5–5)
ALP SERPL-CCNC: 92 U/L (ref 46–116)
ALT SERPL W P-5'-P-CCNC: 15 U/L (ref 12–78)
ANION GAP SERPL CALCULATED.3IONS-SCNC: 2 MMOL/L (ref 4–13)
APTT PPP: 29 SECONDS (ref 23–37)
AST SERPL W P-5'-P-CCNC: 11 U/L (ref 5–45)
BASOPHILS # BLD AUTO: 0.05 THOUSANDS/ΜL (ref 0–0.1)
BASOPHILS NFR BLD AUTO: 1 % (ref 0–1)
BILIRUB SERPL-MCNC: 0.79 MG/DL (ref 0.2–1)
BLD GP AB SCN SERPL QL: NEGATIVE
BUN SERPL-MCNC: 8 MG/DL (ref 5–25)
CALCIUM SERPL-MCNC: 9.3 MG/DL (ref 8.3–10.1)
CHLORIDE SERPL-SCNC: 110 MMOL/L (ref 100–108)
CO2 SERPL-SCNC: 30 MMOL/L (ref 21–32)
CREAT SERPL-MCNC: 0.79 MG/DL (ref 0.6–1.3)
EOSINOPHIL # BLD AUTO: 0.29 THOUSAND/ΜL (ref 0–0.61)
EOSINOPHIL NFR BLD AUTO: 4 % (ref 0–6)
ERYTHROCYTE [DISTWIDTH] IN BLOOD BY AUTOMATED COUNT: 12.4 % (ref 11.6–15.1)
GFR SERPL CREATININE-BSD FRML MDRD: 86 ML/MIN/1.73SQ M
GLUCOSE P FAST SERPL-MCNC: 87 MG/DL (ref 65–99)
HCT VFR BLD AUTO: 39 % (ref 34.8–46.1)
HGB BLD-MCNC: 12.8 G/DL (ref 11.5–15.4)
IMM GRANULOCYTES # BLD AUTO: 0.02 THOUSAND/UL (ref 0–0.2)
IMM GRANULOCYTES NFR BLD AUTO: 0 % (ref 0–2)
INR PPP: 1 (ref 0.84–1.19)
LYMPHOCYTES # BLD AUTO: 2.76 THOUSANDS/ΜL (ref 0.6–4.47)
LYMPHOCYTES NFR BLD AUTO: 40 % (ref 14–44)
MCH RBC QN AUTO: 30.5 PG (ref 26.8–34.3)
MCHC RBC AUTO-ENTMCNC: 32.8 G/DL (ref 31.4–37.4)
MCV RBC AUTO: 93 FL (ref 82–98)
MONOCYTES # BLD AUTO: 0.54 THOUSAND/ΜL (ref 0.17–1.22)
MONOCYTES NFR BLD AUTO: 8 % (ref 4–12)
NEUTROPHILS # BLD AUTO: 3.23 THOUSANDS/ΜL (ref 1.85–7.62)
NEUTS SEG NFR BLD AUTO: 47 % (ref 43–75)
NRBC BLD AUTO-RTO: 0 /100 WBCS
PLATELET # BLD AUTO: 267 THOUSANDS/UL (ref 149–390)
PMV BLD AUTO: 10 FL (ref 8.9–12.7)
POTASSIUM SERPL-SCNC: 4 MMOL/L (ref 3.5–5.3)
PROT SERPL-MCNC: 7.4 G/DL (ref 6.4–8.2)
PROTHROMBIN TIME: 12.8 SECONDS (ref 11.6–14.5)
RBC # BLD AUTO: 4.2 MILLION/UL (ref 3.81–5.12)
RH BLD: POSITIVE
SODIUM SERPL-SCNC: 142 MMOL/L (ref 136–145)
SPECIMEN EXPIRATION DATE: NORMAL
WBC # BLD AUTO: 6.89 THOUSAND/UL (ref 4.31–10.16)

## 2022-06-23 PROCEDURE — 85730 THROMBOPLASTIN TIME PARTIAL: CPT

## 2022-06-23 PROCEDURE — 36415 COLL VENOUS BLD VENIPUNCTURE: CPT

## 2022-06-23 PROCEDURE — 87086 URINE CULTURE/COLONY COUNT: CPT

## 2022-06-23 PROCEDURE — 85025 COMPLETE CBC W/AUTO DIFF WBC: CPT

## 2022-06-23 PROCEDURE — 80053 COMPREHEN METABOLIC PANEL: CPT

## 2022-06-23 PROCEDURE — 86901 BLOOD TYPING SEROLOGIC RH(D): CPT

## 2022-06-23 PROCEDURE — 86850 RBC ANTIBODY SCREEN: CPT

## 2022-06-23 PROCEDURE — 85610 PROTHROMBIN TIME: CPT

## 2022-06-23 PROCEDURE — 86900 BLOOD TYPING SEROLOGIC ABO: CPT

## 2022-06-24 ENCOUNTER — APPOINTMENT (OUTPATIENT)
Dept: LAB | Facility: CLINIC | Age: 52
End: 2022-06-24
Payer: MEDICARE

## 2022-06-24 ENCOUNTER — CONSULT (OUTPATIENT)
Dept: INTERNAL MEDICINE CLINIC | Facility: CLINIC | Age: 52
End: 2022-06-24

## 2022-06-24 VITALS
WEIGHT: 141.6 LBS | OXYGEN SATURATION: 98 % | HEART RATE: 67 BPM | DIASTOLIC BLOOD PRESSURE: 76 MMHG | BODY MASS INDEX: 22.22 KG/M2 | HEIGHT: 67 IN | TEMPERATURE: 96 F | SYSTOLIC BLOOD PRESSURE: 115 MMHG

## 2022-06-24 DIAGNOSIS — B18.2 CHRONIC HEPATITIS C WITHOUT HEPATIC COMA (HCC): Primary | ICD-10-CM

## 2022-06-24 DIAGNOSIS — B18.2 CHRONIC HEPATITIS C WITHOUT HEPATIC COMA (HCC): ICD-10-CM

## 2022-06-24 LAB — BACTERIA UR CULT: NORMAL

## 2022-06-24 PROCEDURE — 36415 COLL VENOUS BLD VENIPUNCTURE: CPT

## 2022-06-24 PROCEDURE — 99214 OFFICE O/P EST MOD 30 MIN: CPT | Performed by: INTERNAL MEDICINE

## 2022-06-24 PROCEDURE — 87522 HEPATITIS C REVRS TRNSCRPJ: CPT

## 2022-06-24 RX ORDER — CYCLOBENZAPRINE HCL 10 MG
TABLET ORAL
COMMUNITY

## 2022-06-24 NOTE — PROGRESS NOTES
[facility]  INTERNAL MEDICINE OFFICE VISIT     PATIENT INFORMATION     Rukhsana Leal   45 y o  female   MRN: [id number]    ASSESSMENT/PLAN     Diagnoses and all orders for this visit:    Chronic hepatitis C without hepatic coma [facility])        - The patient has a history of hepatitis-C that was treated around 2014  Last hepatitis C PCR was obtained in 2017 and was negative  Recent hepatitis C antibody was positive, which is expected  Will order repeat hepatitis-C quantitative PCR prior to surgery to assure there is no active infection  The patient was instructed to obtain this laboratory study today after her appointment  Would hold off on surgery until hepatitis C PCR returns and is negative  -     Hepatitis C RNA, quantitative, PCR; Future    Chronic right hip pain        - The patient is planned for total hip replacement on 07/05/2022 with [facility]  She has a longstanding history of severe right hip pain following a fall at home several years ago  Recent x-ray showed severe degenerative changes  - Revised cardiac risk index score 0 -> class 1 risk -> 3 9% 30 day chance of mortality or MI following the procedure  The patient is low risk for moderate risk procedure  Would recommend awaiting return of hepatitis C PCR to assure disease remission prior to undergoing the surgery  -     cyclobenzaprine (FLEXERIL) 10 mg tablet; cyclobenzaprine 10 mg tablet   Take 1 tablet as needed by oral route at bedtime for 30 days  Schedule a follow-up appointment in 6 months      HEALTH MAINTENANCE     Immunization History   Administered Date(s) Administered    COVID-19 MODERNA VACC 0 5 ML IM 03/22/2021, 04/21/2021, 11/28/2021    INFLUENZA 02/23/2016    Influenza, injectable, quadrivalent, preservative free 0 5 mL 11/06/2019, 10/17/2020    Influenza, seasonal, injectable 02/23/2016    Pneumococcal Polysaccharide PPV23 08/19/2021    Tdap 02/23/2016       CHIEF COMPLAINT     Chief Complaint   Patient presents with    Consult      HISTORY OF PRESENT ILLNESS     The patient is a 53-year-old female with a past medical history of tobacco use (current smoker, 1 pack per day, in the process of trying to quit), did hep C and chronic right-sided hip pain who presented to the clinic today for preop clearance for right-sided total hip replacement  Her surgery is scheduled for 07/05/2022 with [facility]  The patient has experienced severe right hip pain for many years  She stated that a few years ago she fell down the steps and her right hip bent completely backwards  She did not seek medical attention following this accident but has experienced persistent hip pain since that time  A recent x-ray was obtained which showed severe right hip degenerative changes  She currently takes diclofenac at home with mild relief  Otherwise overall she is feeling well and has no other acute complaints  She denies any recent fevers, chills, chest pain, shortness of breath or abdominal pain  She does not experience any dyspnea on exertion or any alarming symptoms  Her last hepatitis C PCR was negative in 2017  REVIEW OF SYSTEMS     Review of Systems   Constitutional: Negative for activity change, appetite change, chills, diaphoresis, fatigue and fever  HENT: Negative for congestion, ear discharge, ear pain, hearing loss, sinus pressure, sinus pain and sore throat  Eyes: Negative for pain, discharge, redness and itching  Respiratory: Negative for cough, chest tightness, shortness of breath and wheezing  Cardiovascular: Negative for chest pain, palpitations and leg swelling  Gastrointestinal: Negative for abdominal distention, abdominal pain, blood in stool, constipation, diarrhea, nausea and vomiting  Genitourinary: Negative for difficulty urinating, dysuria, flank pain and frequency  Musculoskeletal: Positive for arthralgias   Negative for back pain and gait problem  Skin: Negative for color change, pallor and rash  Neurological: Negative for dizziness, weakness, light-headedness, numbness and headaches  Psychiatric/Behavioral: Negative for agitation, behavioral problems, confusion and decreased concentration  OBJECTIVE     Vitals:    06/24/22 0924   BP: 115/76   BP Location: Right arm   Patient Position: Sitting   Cuff Size: Standard   Pulse: 67   Temp: (!) 96 °F (35 6 °C)   TempSrc: Temporal   SpO2: 98%   Weight: 64 2 kg (141 lb 9 6 oz)   Height: 5' 7" (1 702 m)     Physical Exam  Constitutional:       Appearance: She is well-developed  HENT:      Head: Normocephalic and atraumatic  Nose: Nose normal    Eyes:      Conjunctiva/sclera: Conjunctivae normal       Pupils: Pupils are equal, round, and reactive to light  Neck:      Vascular: No JVD  Cardiovascular:      Rate and Rhythm: Normal rate and regular rhythm  Heart sounds: Normal heart sounds  No murmur heard  No friction rub  No gallop  Pulmonary:      Effort: Pulmonary effort is normal  No respiratory distress  Breath sounds: Normal breath sounds  No stridor  No wheezing or rales  Chest:      Chest wall: No tenderness  Abdominal:      General: Bowel sounds are normal  There is no distension  Palpations: Abdomen is soft  There is no mass  Tenderness: There is no abdominal tenderness  There is no guarding or rebound  Hernia: No hernia is present  Musculoskeletal:         General: Tenderness (Right hip, pain with movement) present  No deformity  Right lower leg: No edema  Left lower leg: No edema  Skin:     General: Skin is warm and dry  Neurological:      Mental Status: She is alert and oriented to person, place, and time  Psychiatric:         Mood and Affect: Mood normal          Behavior: Behavior normal          Thought Content:  Thought content normal          Judgment: Judgment normal        CURRENT MEDICATIONS     Current Outpatient Medications:     cyclobenzaprine (FLEXERIL) 10 mg tablet, cyclobenzaprine 10 mg tablet  Take 1 tablet as needed by oral route at bedtime for 30 days  , Disp: , Rfl:     diclofenac (VOLTAREN) 75 mg EC tablet, Take 1 tablet (75 mg total) by mouth 2 (two) times a day as needed (hip and knee pain) Take with food, Disp: 30 tablet, Rfl: 2    nicotine (NICODERM CQ) 21 mg/24 hr TD 24 hr patch, Place 1 patch on the skin every 24 hours, Disp: 28 patch, Rfl: 1    buPROPion (Zyban) 150 MG 12 hr tablet, Take 1 tab PO QHS x 3 nights, then 1 tab PO BID (Patient not taking: No sig reported), Disp: 60 tablet, Rfl: 4    PAST MEDICAL & SURGICAL HISTORY     Past Medical History:   Diagnosis Date    Arthritis     Hip    Hep C w/o coma, chronic ([facility] [address] )     Patient states completed treatment     Past Surgical History:   Procedure Laterality Date    BREAST BIOPSY Left 2011    u/s guided core bx     SECTION      ,     CHOLECYSTECTOMY      GYNECOLOGIC CRYOSURGERY      Cervix    LIVER BIOPSY  2016    Last assessed     SOCIAL & FAMILY HISTORY     Social History     Socioeconomic History    Marital status: Legally      Spouse name: Not on file    Number of children: 4    Years of education: Not on file    Highest education level: Not on file   Occupational History    Not on file   Tobacco Use    Smoking status: Current Every Day Smoker     Packs/day: 1 00    Smokeless tobacco: Never Used    Tobacco comment: more then half a pack   Vaping Use    Vaping Use: Never used   Substance and Sexual Activity    Alcohol use: No    Drug use: No     Comment: heroin-last     Sexual activity: Not Currently     Partners: Male   Other Topics Concern    Not on file   Social History Narrative    Caffeine use - 2 cups a day    4 children     Exercise - walking    Soda - 2 cans a day    Work - call center employee      Social Determinants of Health     Financial Resource Strain: [address] Difficulty of Paying Living Expenses: Not hard at all   Food Insecurity: No Food Insecurity    Worried About Running Out of Food in the Last Year: Never true    Ran Out of Food in the Last Year: Never true   Transportation Needs: No Transportation Needs    Lack of Transportation (Medical): No    Lack of Transportation (Non-Medical): No   Physical Activity: Not on file   Stress: Not on file   Social Connections: Not on file   Intimate Partner Violence: Not on file   Housing Stability: Low Risk     Unable to Pay for Housing in the Last Year: No    Number of Places Lived in the Last Year: 1    Unstable Housing in the Last Year: No     Social History     Substance and Sexual Activity   Alcohol Use No       Social History     Substance and Sexual Activity   Drug Use No    Comment: heroin-last 2003     Social History     Tobacco Use   Smoking Status Current Every Day Smoker    Packs/day: 1 00   Smokeless Tobacco Never Used   Tobacco Comment    more then half a pack     Family History   Problem Relation Age of Onset    Diabetes Mother     Arthritis Mother     Hypertension Mother     Heart disease Father     Lung cancer Father 61    Diabetes Maternal Grandmother     Breast cancer Maternal Grandmother 79    Coronary artery disease Maternal Grandfather     Heart attack Paternal Grandfather     Heart disease Paternal Grandfather     No Known Problems Brother     Asthma Daughter         childhood    No Known Problems Son     No Known Problems Paternal Grandmother     No Known Problems Daughter     No Known Problems Daughter     Stomach cancer Paternal Aunt 61    Skin cancer Paternal Uncle      ==  Jamari Benitez MD  IM Resident, PGY-2  [facility] [address] [address] [address]   [facility] , [facility] [address] [facility] [address], [address] [facility]  Office: [phone]  Fax: [phone]

## 2022-06-24 NOTE — PATIENT INSTRUCTIONS
Please obtain laboratory test      Please continue taking diclofenac for symptomatic relief  Please continue following with Orthopedic surgery

## 2022-06-27 LAB
HCV RNA SERPL NAA+PROBE-ACNC: NORMAL IU/ML
TEST INFORMATION: NORMAL

## 2022-10-07 ENCOUNTER — OFFICE VISIT (OUTPATIENT)
Dept: MULTI SPECIALTY CLINIC | Facility: CLINIC | Age: 52
End: 2022-10-07

## 2022-10-07 VITALS
HEART RATE: 84 BPM | SYSTOLIC BLOOD PRESSURE: 103 MMHG | BODY MASS INDEX: 22.3 KG/M2 | TEMPERATURE: 98.6 F | OXYGEN SATURATION: 98 % | WEIGHT: 142.4 LBS | DIASTOLIC BLOOD PRESSURE: 71 MMHG

## 2022-10-07 DIAGNOSIS — E04.1 THYROID NODULE: ICD-10-CM

## 2022-10-07 DIAGNOSIS — Z72.0 TOBACCO ABUSE: ICD-10-CM

## 2022-10-07 DIAGNOSIS — R22.1 LOCALIZED SWELLING, MASS AND LUMP, NECK: Primary | ICD-10-CM

## 2022-10-07 DIAGNOSIS — M43.6 NECK STIFFNESS: ICD-10-CM

## 2022-10-07 PROCEDURE — 99243 OFF/OP CNSLTJ NEW/EST LOW 30: CPT | Performed by: PHYSICIAN ASSISTANT

## 2022-10-07 PROCEDURE — 31575 DIAGNOSTIC LARYNGOSCOPY: CPT | Performed by: PHYSICIAN ASSISTANT

## 2022-10-07 NOTE — PROGRESS NOTES
Specialty Physician [address] [address] [facility]  [facility]      Otolaryngology -- New Patient Visit    Marvel Almanza is a 47 y o  who presents with a chief complaint of right sided neck lump x 1+ year  HPI: Mary Clemente is a 46 y/o female new to the office referred by PCP for swollen lymph node right side well over 1 year  She recalls even noticing it prior to pandemic  Went to PCP 10/2021 and ordered U/S of neck that was normal in 2022  She noticed it one day in the mirror  No pain  No ST, dysphagia, otalgia  Smoking 1 ppd, trying to cut down, since 14 y/o  No weight loss  No significant hoarseness, unless weather changes  Root canal within the year, but thinks the lump came before it  States she had right hip replacement and her posture prior to, was leaning to right  She sleeps left side but trying to get lower pillow so she is not too elevated  Mild neck stiffness when working  Allergies   Allergen Reactions    Penicillins Hives, Edema and Throat Swelling     Other reaction(s): Unknown Reaction  Category: Allergy;       Past Medical History:   Diagnosis Date    Arthritis     Hip    Hep C w/o coma, chronic ([facility] [address] ) 2014    Patient states completed treatment     Past Surgical History:   Procedure Laterality Date    BREAST BIOPSY Left 2011    u/s guided core bx     SECTION      ,     CHOLECYSTECTOMY      GYNECOLOGIC CRYOSURGERY      Cervix    LIVER BIOPSY  2016    Last assessed     Family History   Problem Relation Age of Onset    Diabetes Mother     Arthritis Mother     Hypertension Mother     Heart disease Father     Lung cancer Father 61    Diabetes Maternal Grandmother     Breast cancer Maternal Grandmother 79    Coronary artery disease Maternal Grandfather     Heart attack Paternal Grandfather     Heart disease Paternal Grandfather     No Known Problems Brother     Asthma Daughter         childhood    No Known Problems Son  No Known Problems Paternal Grandmother     No Known Problems Daughter     No Known Problems Daughter     Stomach cancer Paternal Aunt 61    Skin cancer Paternal Uncle      Current Outpatient Medications on File Prior to Visit   Medication Sig Dispense Refill    buPROPion (Zyban) 150 MG 12 hr tablet Take 1 tab PO QHS x 3 nights, then 1 tab PO BID (Patient not taking: No sig reported) 60 tablet 4    cyclobenzaprine (FLEXERIL) 10 mg tablet cyclobenzaprine 10 mg tablet   Take 1 tablet as needed by oral route at bedtime for 30 days  (Patient not taking: Reported on 10/7/2022)      diclofenac (VOLTAREN) 75 mg EC tablet Take 1 tablet (75 mg total) by mouth 2 (two) times a day as needed (hip and knee pain) Take with food (Patient not taking: Reported on 10/7/2022) 30 tablet 2    nicotine (NICODERM CQ) 21 mg/24 hr TD 24 hr patch Place 1 patch on the skin every 24 hours (Patient not taking: Reported on 10/7/2022) 28 patch 1     No current facility-administered medications on file prior to visit  Review of systems   HEENT :Not Present- Decreased Hearing, Decreased sense of smell, Decreased sense of taste, Ear Discharge, Ear Infection, Excessive Tearing, Hoarseness, Nasal Congestion, Nose Bleed, Oral Ulcers, Runny Nose, Sore Throat, Vertigo, Visual Disturbances (Vision change) and Wears glasses/contact lenses  Neck :Not Present- Neck Pain, Neck Swelling and Swollen Glands  Results reviewed; images from any scan have been personally reviewed:    CT c-spine 8/21/2018 reviewed films Asymmetric left multilevel facet degenerative changes    Physical exam:    /71 (BP Location: Left arm, Patient Position: Sitting, Cuff Size: Standard)   Pulse 84   Temp 98 6 °F (37 °C) (Temporal)   Wt 64 6 kg (142 lb 6 4 oz)   SpO2 98%   BMI 22 30 kg/m²     Constitutional:  Well developed, well nourished and groomed, in no acute distress  Cooperative      Eyes:  Extra-ocular movements intact, pupils equally round, the lids and conjunctivae are normal in appearance  Gaze-normal left and right  Nystagmus absent left and right  Head: Atraumatic, normocephalic with no lesions or palpable masses  Ears:  Auricles normal in appearance bilaterally, mastoid prominence non-tender  External Auditory Canal - Left - external auditory canal clear, no drainage observed, no edema noted in EAC, no exostoses present, no osteoma present, no tenderness noted  Right - external auditory canal is clear, no drainage observed, no edema noted in EAC, no exostoses present, no osteoma present, no tenderness noted  Otoscopic Exam - Tympanic Membrane - Left - intact and normal in appearance, no retraction of TM observed, no serous effusion observed, no evidence of tympanosclerosis  Right - intact and normal in appearance, no retraction of TM observed, no serous effusion observed, no evidence of tympanosclerosis  Nose/Sinuses:  External Inspection of the Nose - no deformities observed, no deviation of bone structure, no skin lesion present, no swelling present  Inspection of the nares - Left - nares are symmetric, no deviation of caudal portion of septum  Right - nares are symmetric, no deviation of caudal portion of septum  Nasal Mucosa - Left - no congestion observed, no mucosal lesion or mass present, no ulcerations observed  Right - no congestion observed, no mucosal lesion or mass present, no ulcerations observed  Nasal Septum - Cartilaginous Septum - midline, no bleeding noted, no crusting present, no perforation noted  Turbinates - Inferior - Left - no hypertrophy, no inflammation noted  Right - no hypertrophy, no inflammation noted  Middle - Left - no inflammation noted  Right - no inflammation noted  Oral Cavity:  Lips - Upper Lip - normal color, moist, no cracks or lesions  Lower Lip - normal color, moist, no cracks or lesions  Teeth - no loose teeth, no missing teeth  Gingiva - no bleeding observed, no inflammation present   Hard Palate - no asymmetry observed, no torus present  Soft Palate - normal, no ulcers noted  Oropharynx - no uvular edema is observed, uvula is midline, no edema of posterior pharyngeal walls observed  Tongue - normal mobility, surfaces without fissures,leukoplakia, ulceration or masses, not enlarged, no pallor noted, no white patches present  Oral Mucosa - no masses, lesions, leukoplakia, scarring and normal Rosmery's ducts, pink and moist, no discoloration noted  Tonsils -no hypertrophy, no ulcerations noted  No exudate  Floor of mouth- normal Warthin's ducts, no lesions, ulceration, leukoplakia or torus mandibularis  Salivary Glands - Submandibular Glands - Left - non-tender  Right - non-tender  Parotid Glands - Left - non-tender  Right - non-tender  Sublingual Salivary Glands - non-tender  Neck:  No visible or palpable cervical lesions or lymphadenopathy, thyroid gland is normal in size and symmetry (no palpable thyroid nodule) and without masses, normal laryngeal elevation with swallowing  No tenderness  Right SCM indurated, visible induration, even with inspection, ?muscular tension vs deep lymph node? Dr Eduardo Crowe palpated bony transverse process approx C4-C5, shifted to right? Nontender  Cardiovascular:  Not examined  Respiratory:  Normal respiratory effort without evidence of retractions or use of accessory muscles  Integument:  Normal appearing without observed masses or lesions  Neurologic:  Cranial nerves II-XII grossly intact bilaterally  Normal gait  Psychiatric:  Alert and oriented to time, place and person, normal affect, normal speech  Procedures   Laryngoscopy by Dr Eduardo Crowe: Nasopharynx unremarkable, with normal eustachian tube orifices and normal Fossa of Rosenmuller bilaterally  Posterior nasopharyngeal and oropharyngeal walls normal  Oropharyngeal mucosa moist, no masses or lesions  Tongue base normal without masses or lesions   Vallecula and pyriform sinuses clear, without pooling of secretions  Epiglottis, aryepiglottic folds and remainder of supraglottis well-appearing  True vocal folds mobile, without masses or lesions, normal glottic chink  Mild arytenoid erythema b/l  Symmetric mild lingual tonsil enlargement  Assessment:   1  Localized swelling, mass and lump, neck  Ambulatory Referral to Otolaryngology    CT soft tissue neck w contrast   2  Neck stiffness  CT soft tissue neck w contrast   3  Tobacco abuse  CT soft tissue neck w contrast       Orders  Orders Placed This Encounter   Procedures    Diabetic foot exam     This order was created via procedure documentation    CT soft tissue neck w contrast     Standing Status:   Future     Standing Expiration Date:   10/7/2026     Scheduling Instructions:      Nothing to eat 3 hours prior to this test   We encourage you to drink clear liquid up until the time of your study  Please bring your insurance cards, a form of photo ID and a list of your medications with you  Arrive 15 minutes prior to your appointment time to register  On the day of your test, please bring any prior CT or MRI studies of this area with you that were not performed at a [facility]  To schedule this appointment, please contact Central Scheduling at [phone]  Order Specific Question:   Is the patient pregnant? Answer:   Unknown     Order Specific Question:   What is the patient's sedation requirement? Answer:   No Sedation     Order Specific Question:   Contrast information:     Answer:   IV     Order Specific Question:   Did the patient ever have a reaction to x-ray dye? If yes, please verify the type of allergy and order the contrast allergy prep       Answer:   Unknown     Order Specific Question:   Release to patient through Mychart     Answer:   Immediate     Order Specific Question:   Reason for Exam (FREE TEXT)     Answer:   right neck swelling over 1 year     Order Specific Question:   When should the test be performed? Answer:   Elective- non urgent         Discussion/Plan:    1  Susy Carrillo has had right sided neck swelling since preCOVID, over 3 years, and it is hard to discern if this is musculoskeletal vs lymph node  She had normal U/S  Scope of nasopharynx/larynx also performed today which was negative  Suspect this may be related to cervical spinal issues causing protrusion of the SCM muscles per Dr Conchita Gaming  Options include: Observation in 3-6 months since U/S negative, vs obtain CT neck with contrast given length of time and smoking history vs PT neck  Will proceed with both CT neck  Also had incidental right thyroid nodule on CT c-spine 2018, so may need thyroid U/S separately  Await CT 1st         Dictation software was used to dictate this note  It may contain errors with dictating incorrect words/spelling  Please contact provider directly for any questions  Thank you for allowing me to participate in the care of your patient

## 2024-02-26 ENCOUNTER — OFFICE VISIT (OUTPATIENT)
Dept: URGENT CARE | Age: 54
End: 2024-02-26
Payer: COMMERCIAL

## 2024-02-26 ENCOUNTER — TELEPHONE (OUTPATIENT)
Dept: URGENT CARE | Age: 54
End: 2024-02-26

## 2024-02-26 ENCOUNTER — APPOINTMENT (OUTPATIENT)
Dept: RADIOLOGY | Age: 54
End: 2024-02-26
Payer: COMMERCIAL

## 2024-02-26 VITALS
HEART RATE: 66 BPM | DIASTOLIC BLOOD PRESSURE: 80 MMHG | OXYGEN SATURATION: 99 % | TEMPERATURE: 98.3 F | RESPIRATION RATE: 18 BRPM | SYSTOLIC BLOOD PRESSURE: 120 MMHG

## 2024-02-26 DIAGNOSIS — S99.921A RIGHT FOOT INJURY, INITIAL ENCOUNTER: ICD-10-CM

## 2024-02-26 DIAGNOSIS — S92.034A CLOSED NONDISPLACED AVULSION FRACTURE OF TUBEROSITY OF RIGHT CALCANEUS, INITIAL ENCOUNTER: Primary | ICD-10-CM

## 2024-02-26 DIAGNOSIS — S93.401A SPRAIN OF RIGHT ANKLE, UNSPECIFIED LIGAMENT, INITIAL ENCOUNTER: Primary | ICD-10-CM

## 2024-02-26 PROCEDURE — 73630 X-RAY EXAM OF FOOT: CPT

## 2024-02-26 PROCEDURE — G0383 LEV 4 HOSP TYPE B ED VISIT: HCPCS | Performed by: STUDENT IN AN ORGANIZED HEALTH CARE EDUCATION/TRAINING PROGRAM

## 2024-02-26 PROCEDURE — S9083 URGENT CARE CENTER GLOBAL: HCPCS | Performed by: STUDENT IN AN ORGANIZED HEALTH CARE EDUCATION/TRAINING PROGRAM

## 2024-02-26 PROCEDURE — 73610 X-RAY EXAM OF ANKLE: CPT

## 2024-02-26 RX ORDER — DIPHENOXYLATE HYDROCHLORIDE AND ATROPINE SULFATE 2.5; .025 MG/1; MG/1
2 TABLET ORAL DAILY
COMMUNITY

## 2024-02-26 NOTE — PROGRESS NOTES
[facility]        NAME: Kamille Villalta is a 53 y.o. female  : 1970    MRN: [id number]  DATE: 2024  TIME: 2:58 PM    Assessment and Plan   Sprain of right ankle, unspecified ligament, initial encounter [S93.401A]  1. Sprain of right ankle, unspecified ligament, initial encounter        2. Right foot injury, initial encounter  XR foot 3+ vw right    XR ankle 3+ vw right      No acute fracture/dislocation on my read of her x-ray.  Symptoms consistent with ankle sprain.  As she has been having significant pain with ambulation, she was provided with cam walking boot today.  Will follow-up with PCP if her symptoms or not resolving in the coming days.      Patient Instructions   I do not see any acute fracture nor dislocation on your xray of your foot and ankle.  The radiologist will also read your x-ray and the results will be available in MyChart.  We discussed options for your ankle sprain today.  As you are having significant pain, we put a walking boot on.  You should not drive when wearing the boot.  I recommend following up with your PCP if your symptoms are not improving in the coming days.  Please continue with elevation, rest, you may take ibuprofen or Tylenol as needed for pain.    Follow up with PCP in 3-5 days.  Proceed to  ER if symptoms worsen.    Chief Complaint     Chief Complaint   Patient presents with    Foot Pain     Patient states that last night she was going down stairs and missed the last step landing wrong on her right foot and ankle. She is having bruising and swelling at the site as well as pain when ambulating.          History of Present Illness       Patient presents for injury of her right ankle and foot.  She was walking downstairs last night, missed 1 step and landed on the outside portion of her foot causing pain to the top of her foot and lateral ankle area.  She has been able to walk on it but has a significant limp and pain with this.  She has had  swelling and bruising to the lateral ankle and top of her foot since then.        Review of Systems   Review of Systems   All other systems reviewed and are negative.        Current Medications       Current Outpatient Medications:     multivitamin (THERAGRAN) TABS, Take 2 tablets by mouth daily, Disp: , Rfl:     buPROPion (Zyban) 150 MG 12 hr tablet, Take 1 tab PO QHS x 3 nights, then 1 tab PO BID (Patient not taking: Reported on 2022), Disp: 60 tablet, Rfl: 4    cyclobenzaprine (FLEXERIL) 10 mg tablet, cyclobenzaprine 10 mg tablet  Take 1 tablet as needed by oral route at bedtime for 30 days. (Patient not taking: Reported on 10/7/2022), Disp: , Rfl:     diclofenac (VOLTAREN) 75 mg EC tablet, Take 1 tablet (75 mg total) by mouth 2 (two) times a day as needed (hip and knee pain) Take with food (Patient not taking: Reported on 10/7/2022), Disp: 30 tablet, Rfl: 2    nicotine (NICODERM CQ) 21 mg/24 hr TD 24 hr patch, Place 1 patch on the skin every 24 hours (Patient not taking: Reported on 10/7/2022), Disp: 28 patch, Rfl: 1    Current Allergies     Allergies as of 2024 - Reviewed 2024   Allergen Reaction Noted    Penicillins Hives, Edema, and Throat Swelling 2012            The following portions of the patient's history were reviewed and updated as appropriate: allergies, current medications, past family history, past medical history, past social history, past surgical history and problem list.     Past Medical History:   Diagnosis Date    Arthritis     Hip    Hep C w/o coma, chronic (HCC) 2014    Patient states completed treatment       Past Surgical History:   Procedure Laterality Date    BREAST BIOPSY Left 2011    u/s guided core bx     SECTION      ,     CHOLECYSTECTOMY      GYNECOLOGIC CRYOSURGERY      Cervix    LIVER BIOPSY  2016    Last assessed       Family History   Problem Relation Age of Onset    Diabetes Mother     Arthritis Mother     Hypertension Mother      Heart disease Father     Lung cancer Father 60    Diabetes Maternal Grandmother     Breast cancer Maternal Grandmother 70    Coronary artery disease Maternal Grandfather     Heart attack Paternal Grandfather     Heart disease Paternal Grandfather     No Known Problems Brother     Asthma Daughter         childhood    No Known Problems Son     No Known Problems Paternal Grandmother     No Known Problems Daughter     No Known Problems Daughter     Stomach cancer Paternal Aunt 60    Skin cancer Paternal Uncle          Medications have been verified.        Objective   /80   Pulse 66   Temp 98.3 °F (36.8 °C)   Resp 18   SpO2 99%   No LMP recorded.       Physical Exam     Physical Exam  Vitals and nursing note reviewed.   Constitutional:       General: She is not in acute distress.     Appearance: She is not toxic-appearing.   HENT:      Head: Normocephalic and atraumatic.      Right Ear: External ear normal.      Left Ear: External ear normal.      Nose: Nose normal.      Mouth/Throat:      Mouth: Mucous membranes are moist.   Eyes:      Extraocular Movements: Extraocular movements intact.      Conjunctiva/sclera: Conjunctivae normal.   Musculoskeletal:         General: Swelling and tenderness present. No deformity.      Comments: No tenderness to lateral malleolus, 5th metatarsal.  Tender over lateral ligaments and proximal dorsal foot  Able to wiggle toes  ROM reduced 2/2 pain in all planes   Skin:     General: Skin is warm and dry.      Capillary Refill: Capillary refill takes less than 2 seconds.      Findings: Bruising present.   Neurological:      Mental Status: She is alert.

## 2024-02-26 NOTE — LETTER
February 26, 2024     Patient: Kamille Villalta   YOB: 1970   Date of Visit: 2/26/2024       To Whom it May Concern:    Kamille Villalta was seen in my clinic on 2/26/2024.  Please excuse her from work today.    If you have any questions or concerns, please don't hesitate to call.         Sincerely,          Margo Campbell, DO

## 2024-02-26 NOTE — TELEPHONE ENCOUNTER
Left message for patient regarding radiology read of her foot x-ray which showed suspicion for a small lateral calcaneal avulsion fracture.  She did have tenderness in this area, she was placed in a boot due to her level of tenderness.  Advised that she continue with the boot, will place referral for podiatry as well.  To call back with any questions.

## 2024-02-26 NOTE — PATIENT INSTRUCTIONS
I do not see any acute fracture nor dislocation on your xray of your foot and ankle.  The radiologist will also read your x-ray and the results will be available in [facility].  We discussed options for your ankle sprain today.  As you are having significant pain, we put a walking boot on.  You should not drive when wearing the boot.  I recommend following up with your PCP if your symptoms are not improving in the coming days.  Please continue with elevation, rest, you may take ibuprofen or Tylenol as needed for pain.

## 2025-04-18 ENCOUNTER — APPOINTMENT (OUTPATIENT)
Dept: RADIOLOGY | Age: 55
End: 2025-04-18
Payer: COMMERCIAL

## 2025-04-18 ENCOUNTER — OFFICE VISIT (OUTPATIENT)
Dept: URGENT CARE | Age: 55
End: 2025-04-18
Payer: COMMERCIAL

## 2025-04-18 VITALS
SYSTOLIC BLOOD PRESSURE: 131 MMHG | HEART RATE: 73 BPM | HEIGHT: 67 IN | OXYGEN SATURATION: 99 % | BODY MASS INDEX: 22.76 KG/M2 | RESPIRATION RATE: 18 BRPM | DIASTOLIC BLOOD PRESSURE: 78 MMHG | WEIGHT: 145 LBS | TEMPERATURE: 98 F

## 2025-04-18 DIAGNOSIS — M79.604 PAIN OF RIGHT LOWER EXTREMITY: ICD-10-CM

## 2025-04-18 DIAGNOSIS — M79.604 PAIN OF RIGHT LOWER EXTREMITY: Primary | ICD-10-CM

## 2025-04-18 PROCEDURE — S9083 URGENT CARE CENTER GLOBAL: HCPCS

## 2025-04-18 PROCEDURE — G0382 LEV 3 HOSP TYPE B ED VISIT: HCPCS

## 2025-04-18 PROCEDURE — 73502 X-RAY EXAM HIP UNI 2-3 VIEWS: CPT

## 2025-04-18 RX ORDER — METHOCARBAMOL 500 MG/1
500 TABLET, FILM COATED ORAL 3 TIMES DAILY PRN
Qty: 21 TABLET | Refills: 0 | Status: SHIPPED | OUTPATIENT
Start: 2025-04-18 | End: 2025-04-25

## 2025-04-18 RX ORDER — PREDNISONE 10 MG/1
TABLET ORAL
Qty: 15 TABLET | Refills: 0 | Status: SHIPPED | OUTPATIENT
Start: 2025-04-18 | End: 2025-04-23

## 2025-04-18 RX ORDER — IBUPROFEN 200 MG
600 TABLET ORAL EVERY 6 HOURS PRN
COMMUNITY

## 2025-04-18 NOTE — PATIENT INSTRUCTIONS
X-reviewed, no acute abnormality noted, awaiting official read.   Symptoms most consistent with sciatica. Please begin steroid taper and prednisone as directed. Do not take NSAIDs (Ibuprofen, Aleve, Advil, etc.) while taking prednisone; you may continue Tylenol.   Follow up with PCP if no relief within 1-2 weeks.   Go to ED for red flag symptoms such as numbness of the rectum or genitals, bowel or bladder dysfunction, etc.